# Patient Record
Sex: FEMALE | Race: WHITE | NOT HISPANIC OR LATINO | Employment: STUDENT | URBAN - METROPOLITAN AREA
[De-identification: names, ages, dates, MRNs, and addresses within clinical notes are randomized per-mention and may not be internally consistent; named-entity substitution may affect disease eponyms.]

---
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
## 2018-01-11 NOTE — RESULT NOTES
Verified Results  [facility]) CBC+Platelet+Hem Review [id number] 12:00AM Daphine Nelson     Test Name Result Flag Reference   WBC 7 7 x10E3/uL  3 4-10 8   RBC 4 61 x10E6/uL  3 77-5 28   Hemoglobin 10 1 g/dL L 11 1-15 9   Hematocrit 32 7 % L 34 0-46  6   MCV 71 fL L 79-97   MCH 21 9 pg L 26 6-33 0   MCHC 30 9 g/dL L 31 5-35 7   RDW 16 6 % H 12 3-15 4   Platelets 478 C06T2/DK  150-379   Neutrophils 47 %     Lymphs 47 %     Monocytes 4 %     Eos 0 %     Basos 2 %     Neutrophils Absolute 3 6 X10E3/uL  1 4-7 0   Lymphs (Absolute) 3 6 X10E3/uL H 0 7-3 1   Monocytes(Absolute) 0 3 X10E3/uL  0 1-0 9   Eos (Absolute Value) 0 0 X10E3/uL  0 0-0 4   Baso(Absolute) 0 2 X10E3/uL  0 0-0 2   RBC Comment   Normal   Polychromasia present   Elliptocytes present  Microcytes present     Platelet Comment Adequate  Adequate     (1) IRON [id number] 12:00AM Daphine Nelson     Test Name Result Flag Reference   Iron, Serum 30 ug/dL       (1) FERRITIN [id number] 12:00AM Daphine Nelson     Test Name Result Flag Reference   Ferritin, Serum 3 ng/mL L 15-77     [facility]) CMP14+eGFR [id number] 12:00AM Daphine Nelson     Test Name Result Flag Reference   Glucose, Serum 93 mg/dL  65-99   BUN 10 mg/dL  6-20   Creatinine, Serum 0 60 mg/dL  0 57-1 00   eGFR If NonAfricn Am 133 mL/min/1 73  >59   eGFR If Africn Am 154 mL/min/1 73  >59   BUN/Creatinine Ratio 17  8-20   Sodium, Serum 143 mmol/L  134-144   Potassium, Serum 4 0 mmol/L  3 5-5 2   Chloride, Serum 105 mmol/L     Carbon Dioxide, Total 22 mmol/L  18-29   Calcium, Serum 9 2 mg/dL  8 7-10 2   Protein, Total, Serum 7 0 g/dL  6 0-8 5   Albumin, Serum 4 2 g/dL  3 5-5 5   Globulin, Total 2 8 g/dL  1 5-4 5   A/G Ratio 1 5  1 1-2 5   Bilirubin, Total 0 3 mg/dL  0 0-1 2   Alkaline Phosphatase, S 35 IU/L L    AST (SGOT) 16 IU/L  0-40   ALT (SGPT) 12 IU/L  0-32     (LC) Mono Qual W/Rflx Qn [id number] 12:00AM Daphine Nelson     Test Name Result Flag Reference   Mono Qual W/Rflx Qn Negative Negative   The sensitivity of Heterophile antibody testing is 80-90%  Sydelle Fret IgM testing offers higher sensitivity

## 2018-01-13 VITALS
TEMPERATURE: 97.6 F | WEIGHT: 125.56 LBS | HEART RATE: 79 BPM | DIASTOLIC BLOOD PRESSURE: 70 MMHG | SYSTOLIC BLOOD PRESSURE: 110 MMHG | RESPIRATION RATE: 12 BRPM | HEIGHT: 51 IN | OXYGEN SATURATION: 99 % | BODY MASS INDEX: 33.7 KG/M2

## 2018-01-14 VITALS
RESPIRATION RATE: 16 BRPM | WEIGHT: 115.56 LBS | TEMPERATURE: 99.5 F | HEART RATE: 111 BPM | BODY MASS INDEX: 19.73 KG/M2 | DIASTOLIC BLOOD PRESSURE: 78 MMHG | SYSTOLIC BLOOD PRESSURE: 112 MMHG | OXYGEN SATURATION: 98 % | HEIGHT: 64 IN

## 2018-01-14 NOTE — PROGRESS NOTES
Assessment    1  Encounter for birth control pills maintenance (V25 41) (Z30 41)   2  Iron deficiency anemia (280 9) (D50 9)   3  Irregular menstrual cycle (626 4) (N92 6)    Plan  Iron deficiency anemia, Irregular menstrual cycle    · (1) CBC/PLT/DIFF; Status:Active; Requested for:[id number];    · (1) IRON SATURATION %, TIBC; Status:Active; Requested for:41Mfk6332;    · (1) IRON; Status:Active; Requested for:41Pbw1187;     Discussion/Summary    Patient is a 57-year-old female who presents today at my request for birth control med check, as it has been over 6 months, as well as follow-up for her anemia  Patient was found to have mild iron deficiency anemia earlier this year with a hemoglobin of 10 5  She did admit to heavy periods which seemed to improve slightly since being on birth control  She was initially taking iron supplements 3 times a week, but upon my referral for further evaluation of her symptoms to GYN, day advise that she take her iron supplements daily while she has her period  Patient has not had blood work done since January 2016 and she admits to partial compliance with iron supplement  She is compliant on her daily birth control and is otherwise tolerating the medication well  She gets her menstrual cycle once a month during her placebo pills and states bleeding is very heavy the first 1-2 days but generally tapers off  She does not need a birth control pill refilled today  I will have patient obtain more blood work for follow-up to anemia and make sure the hemoglobin is stable or improved and she was given a prescription for that today  She will get that done at her earliest convenience and we will call her with results and make any adjustments to treatment as necessary  Otherwise, she will continue her birth control daily  Pap recommended age 22  Follow-up in 6 months  Possible side effects of new medications were reviewed with the patient/guardian today   The treatment plan was reviewed with the patient/guardian  The patient/guardian understands and agrees with the treatment plan      Chief Complaint  Pt presents for a birth control med check  History of Present Illness  patient is a 10y/o female here for birth control check and anemia f/u  GYN suggested her taking iron 3 times a week, and daily on her period, as she was still anemic with recheck upon my referral - saw [facility] (jaqueline hand )  She has been taking iron supplement 3 times a week, she tries to take supplement daily when she is on her period  She is not fully compliant with that  SHe is otherwise compliant on her daily birth control, feeling well on medication, denies any intolerance  LMP 9/12/16  Review of Systems    Constitutional: No complaints of fever or chills, feels well, no tiredness, no recent weight gain or loss  Cardiovascular: No complaints of chest pain, no palpitations, normal heart rate, no lower extremity edema  Respiratory: No complaints of cough, no shortness of breath, no wheezing, no leg claudication  Gastrointestinal: No complaints of abdominal pain, no nausea or vomiting, no constipation, no diarrhea or bloody stools  Genitourinary: No complaints of incontinence, no pelvic pain, no dysuria or dysmenorrhea, no abnormal vaginal bleeding or vaginal discharge  Neurological: No complaints of headache, no numbness or tingling, no confusion, no dizziness, no limb weakness, no convulsions or fainting, no difficulty walking  Psychiatric: No complaints of feeling depressed, no suicidal thoughts, no emotional problems, no anxiety, no sleep disturbances, no change in personality  Active Problems    1  Elevated liver enzymes (790 5) (R74 8)   2  Iron deficiency anemia (280 9) (D50 9)   3  Irregular menstrual cycle (626 4) (N92 6)   4  Meningococcal vaccination administered at current visit (V03 89) (Z23)   5   Need for prophylactic vaccination against human papillomavirus (V04 89) (Z23)    Past Medical History    1  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)   2  History of Birth control counseling (V25 09) (Z30 9)   3  History of 's permit physical examination (V70 3) (Z02 4)   4  History of Flu vaccine need (V04 81) (Z23)   5  History of dizziness (V13 89) (Z87 898)   6  History of fatigue (V13 89) (Z87 898)   7  History of headache (V13 89) (Z87 898)   8  History of Shakiness (781 0) (R25 1)   9  History of Urgency of urination (788 63) (R39 15)    Surgical History    1  Denied: History Of Prior Surgery    Family History  Family History    1  No pertinent family history    The family history was reviewed and updated today  Social History    · Never a smoker  The social history was reviewed and updated today  Current Meds   1  Norgestim-Eth Estrad Triphasic 0 18/0 215/0 25 MG-35 MCG Oral Tablet; Take 1 tablet   daily; Therapy: 45LNJ5714 to (Last Rx:99Hti8151)  Requested for: 91TVF6639 Ordered    The medication list was reviewed and updated today  Allergies    1  No Known Drug Allergies    Vitals  Vital Signs    Recorded: 38OZG1636 47:34CL   Systolic 013, LUE, Sitting   Diastolic 76, LUE, Sitting   Heart Rate 84   Pulse Quality Norm   Respiration Quality Norm   Respiration 16   Temperature 98 8 F, Tympanic   LMP 12-Sep-2016   O2 Saturation 99   Height 5 ft 4 in   Weight 121 lb 8 0 oz   BMI Calculated 20 86   BSA Calculated 1 59     Physical Exam    Constitutional - General appearance: No acute distress, well appearing and well nourished  alert, appears healthy, within normal limits of ideal weight, well hydrated and thinner stature  Pulmonary - Respiratory effort: Normal respiratory rate and rhythm, no increased work of breathing  Auscultation of lungs: Clear bilaterally  Cardiovascular - Auscultation of heart: Regular rate and rhythm, normal S1 and S2, no murmur  Pedal pulses: Normal, 2+ bilaterally  Abdomen - Abdomen: Normal bowel sounds, soft, non-tender, no masses   The abdomen was flat  Bowel sounds were normal  The abdomen was soft and nontender  Lymphatic - Palpation of lymph nodes in neck: No anterior or posterior cervical lymphadenopathy  Musculoskeletal - Gait and station: Normal gait  Skin - Skin and subcutaneous tissue: Normal    Psychiatric - Orientation to person, place, and time: Normal  Mood and affect: Normal    Additional Findings - vitals reviewed  Signatures   Electronically signed by : [facility]; Sep 13 2016 12:19PM EST                       (Author)    Electronically signed by : [facility]; Sep 13 2016 12:20PM EST                       (Author)    Electronically signed by :  Kvng Bearden DO; Sep 13 2016 12:23PM EST                       (Author)

## 2018-01-16 NOTE — RESULT NOTES
Verified Results  (1) CBC/PLT/DIFF [id number] 10:02AM Livia Single Order Number: [id number]    TW Order Number: [id number]     Test Name Result Flag Reference   WBC COUNT 5 61 Thousand/uL  4 31-10 16   RBC COUNT 4 50 Million/uL  3 81-5 12   HEMOGLOBIN 10 5 g/dL L 11 5-15 4   HEMATOCRIT 34 1 % L 34 8-46  1   MCV 75 8 fL L 82 0-98 0   MCH 23 3 pg L 26 8-34 3   MCHC 30 8 g/dL L 31 4-37 4   RDW 14 1 %  11 6-15 1   MPV 12 1 fL  8 9-12 7   PLATELET COUNT 264 Thousands/uL  149-390   nRBC AUTOMATED 0 /100 WBCs     NEUTROPHILS RELATIVE PERCENT 48 %  43-75   LYMPHOCYTES RELATIVE PERCENT 39 %  14-44   MONOCYTES RELATIVE PERCENT 11 %  4-12   EOSINOPHILS RELATIVE PERCENT 1 %  0-6   BASOPHILS RELATIVE PERCENT 1 %  0-1   NEUTROPHILS ABSOLUTE COUNT 2 75 Thousands/µL  1 85-7 62   LYMPHOCYTES ABSOLUTE COUNT 2 16 Thousands/µL  0 60-4 47   MONOCYTES ABSOLUTE COUNT 0 63 Thousand/µL  0 17-1 22   EOSINOPHILS ABSOLUTE COUNT 0 04 Thousand/µL  0 00-0 61   BASOPHILS ABSOLUTE COUNT 0 03 Thousands/µL  0 00-0 10     (1) TSH WITH FT4 REFLEX 30Jan2016 10:02AM Mukesh Laser   TW Order Number: [id number]    Patients undergoing fluorescein dye angiography may retain small amounts of fluorescein in the body for 48-72 hours post procedure  Samples containing fluorescein can produce falsely depressed TSH values  If the patient had this procedure,a specimen should be resubmitted post fluorescein clearance  The recommended reference ranges for TSH during pregnancy are as follows:  First trimester 0 1 to 2 5 uIU/mL  Second trimester  0 2 to 3 0 uIU/mL  Third trimester 0 3 to 3 0 uIU/m     Test Name Result Flag Reference   TSH 1 613 uIU/mL  0 463-3 980     (1) LH (LEUTINIZING HORMONE) [id number] 10:02AM Livia Single Order Number: [id number]    Menstruating Females:     Follicular phase 6 8-68 3 mIU/mL    Mid-cycle phase  22 8-76 1 mIU/mL    Luteal phase     0 6-13 5 mIU/mL  Postmemopausal Females     On Menopausal Hormone Therapy (MHT) 1 1-52 4 mIU/mL    Untreated                           8 6-61 8 mIU/mL     Test Name Result Flag Reference   LUTEINIZING HORMONE 0 3 mIU/mL       (1) [facility] [id number] 10:02AM Nuno Evenjanna Order Number: [id number]    Menstruating Females: Follicular Phase   2 0-24 1 mIU/mL     Mid-cycle Phase    5 2-17 5 mIU/mL     Luteal Phase       1 7-9 5  mIU/mL   Postmenopausal Females    On Menopausal Hormone Therapy (HRT) 5 9-72 8 mIU/mL    Untreated                           12 7-132 2 mIU/mL     Test Name Result Flag Reference   FOLLICLE STIMULATING HORMONE 0 3 mIU/mL       (1) TRANSFERRIN 30Jan2016 10:02AM Nuno Evenjanna Order Number: [id number]     Test Name Result Flag Reference   TRANSFERRIN 456 mg/dL H 200-400     (1) FERRITIN [id number] 10:02AM Nuno Evenjanna Order Number: [id number]     Test Name Result Flag Reference   FERRITIN 2 ng/mL L 8-388     (1) PROGESTERONE [id number] 10:02AM Nuno Evenjanna Order Number: [id number]    Serum progesterone 5-25 ng/mL should not be the sole determinant in excluding pregnancy  Menstruating Females:     Follicular phase 1 811-8 44 ng/mL      Luteal phase     2 25-24 2 ng/mL    Mid-luteal phase 8 76-21 6 ng/mL  Postmenopausal Females  <0 200-0 901 ng/mL    Pregnant Females:    First trimester of pregnancy  11 4-41 0 ng/mL    Second trimester of pregnancy 13 8-156 ng/mL    Third trimester of pregnancy  51 4->200 ng/mL     Test Name Result Flag Reference   PROGESTERONE 0 60 ng/mL       (1) IRON SATURATION %, TIBC 30Jan2016 10:02AM Nuno Evenjanna Order Number: [id number]     Test Name Result Flag Reference   IRON SATURATION 6 %     TOTAL IRON BINDING CAPACITY 663 ug/dL H 250-450   IRON 43 ug/dL L      (1) TESTOSTERONE [id number] 10:02AM Nuno Evenjanna Order Number: [id number]     Order Number: [id number]     Test Name Result Flag Reference   TESTOSTERONE 10 33 ng/dL L 14-76     * US PELVIS COMPLETE (TRANSABDOMINAL AND TRANSVAGINAL) [id number] 09:26AM Sonia Reardon     Test Name Result Flag Reference   US PELVIS COMPLETE (TRANSABDOMINAL AND TRANSVAGINAL) (Report)     PELVIC ULTRASOUND, COMPLETE     INDICATION: 14year-old with irregular menses  LMP was 1/1/2016      COMPARISON: None  TECHNIQUE:  Transabdominal pelvic ultrasound was performed in sagittal and transverse planes with a curvilinear transducer  Additional transvaginal imaging was performed to better evaluate the endometrium and ovaries  Imaging included volumetric    sweeps as well as traditional still imaging technique  FINDINGS:     UTERUS:   The uterus is anteverted in position, measuring 7 1 x 3 7 x 3 8 cm  Contour and echotexture appear normal      The cervix shows no suspicious abnormality  ENDOMETRIUM:    Normal caliber of 5 mm  Homogenous and normal in appearance  OVARIES/ADNEXA:   Right ovary: 3 3 x 1 7 x 1 9 cm, total volume 5 5 mL  No suspicious right ovarian abnormality  Doppler flow within normal limits  Left ovary: 2 5 x 1 3 x 1 5 cm, total volume 2 6 mL  No suspicious left ovarian abnormality  Doppler flow within normal limits  No suspicious adnexal mass or loculated collections  There is small amount of simple free fluid in the pelvis, likely physiologic in this premenopausal female  IMPRESSION:      Normal pelvic ultrasound          Signed by:   Lana Cockayne, MD   2/1/16

## 2018-04-19 DIAGNOSIS — Z30.9 ENCOUNTER FOR CONTRACEPTIVE MANAGEMENT, UNSPECIFIED TYPE: Primary | ICD-10-CM

## 2018-04-19 RX ORDER — NORGESTIMATE AND ETHINYL ESTRADIOL 7DAYSX3 28
1 KIT ORAL DAILY
COMMUNITY
Start: 2012-10-23 | End: 2018-04-19 | Stop reason: SDUPTHER

## 2018-04-20 DIAGNOSIS — Z30.9 ENCOUNTER FOR CONTRACEPTIVE MANAGEMENT, UNSPECIFIED TYPE: ICD-10-CM

## 2018-04-20 RX ORDER — MELOXICAM 7.5 MG/1
1-2 TABLET ORAL DAILY
COMMUNITY
Start: 2017-08-22 | End: 2018-10-08

## 2018-04-20 RX ORDER — NORGESTIMATE AND ETHINYL ESTRADIOL 7DAYSX3 28
1 KIT ORAL DAILY
Qty: 28 TABLET | Refills: 0 | Status: SHIPPED | OUTPATIENT
Start: 2018-04-20 | End: 2018-04-20 | Stop reason: SDUPTHER

## 2018-04-20 NOTE — TELEPHONE ENCOUNTER
Pt called prescription line  Stated she changed her insurance and they are starting a mail order and she will run out of her birthcontrol before she gets mail order   Pt wondering  if we can send 30 days refill to [facility]

## 2018-04-20 NOTE — TELEPHONE ENCOUNTER
Pt left message on rx line, her BC is on back order through her mail order, she'd like a one month supply sent to [facility]

## 2018-04-21 RX ORDER — NORGESTIMATE AND ETHINYL ESTRADIOL 7DAYSX3 28
1 KIT ORAL DAILY
Qty: 28 TABLET | Refills: 0 | Status: SHIPPED | OUTPATIENT
Start: 2018-04-21 | End: 2018-09-21 | Stop reason: SDUPTHER

## 2018-09-19 DIAGNOSIS — Z30.9 ENCOUNTER FOR CONTRACEPTIVE MANAGEMENT, UNSPECIFIED TYPE: ICD-10-CM

## 2018-09-21 RX ORDER — NORGESTIMATE AND ETHINYL ESTRADIOL 7DAYSX3 28
1 KIT ORAL DAILY
Qty: 28 TABLET | Refills: 0 | Status: SHIPPED | OUTPATIENT
Start: 2018-09-21 | End: 2018-09-24

## 2018-09-21 NOTE — TELEPHONE ENCOUNTER
Please octavia up for me accordingly but pt will also need f/u appt for birth control  PLEASE CALL PT TO SET UP AN APPT FOR BC MED CHECK   HASNT BEEN SEEN IN OVER A YEAR

## 2018-09-24 ENCOUNTER — TELEPHONE (OUTPATIENT)
Dept: FAMILY MEDICINE CLINIC | Facility: CLINIC | Age: 18
End: 2018-09-24

## 2018-09-24 DIAGNOSIS — Z30.9 ENCOUNTER FOR CONTRACEPTIVE MANAGEMENT, UNSPECIFIED TYPE: Primary | ICD-10-CM

## 2018-09-24 RX ORDER — NORGESTIMATE AND ETHINYL ESTRADIOL 7DAYSX3 28
1 KIT ORAL DAILY
Qty: 28 TABLET | Refills: 2 | Status: SHIPPED | OUTPATIENT
Start: 2018-09-24 | End: 2018-10-08 | Stop reason: SDUPTHER

## 2018-09-24 NOTE — TELEPHONE ENCOUNTER
Please tell pt we just got notification from her pharmacy 9/20 (over weekend)  I had sent her birth control refill prior to that (9/19)   I sent the tri sprintec to her local pharm ([facility]) for her have enough until she can get in for med check

## 2018-09-24 NOTE — TELEPHONE ENCOUNTER
Pt stated she uses [facility] mail [facility]  I advised pt she can  the one month of her oral birth control see wilfredo and get a new script [facility] to [facility]

## 2018-09-24 NOTE — TELEPHONE ENCOUNTER
I called Kim Wright and left a detailed message on her cell phone consent ok  I gave her all the details of Latonya's  message and requested she please  call to schedule a med check since Vesna Ek sent enough until she can get into the office

## 2018-09-24 NOTE — TELEPHONE ENCOUNTER
Pt called this morning stating that she has not received her BC so she called her mail order and they stated that they have sent us several of requests to have her [facility] brand name changed due to insurance purposes  Pt also asked if there is any way that we can send something to her local pharmacy as well due to the fact that she took her last BC pill on Saturday and does not want to be without her pills for too long  Please call Pt and see what Pharmacy  When I spoke to her this morning she wanted it sent to a pharmacy next to her school  But I assume she is out of school now so please check with Pt what local pharmacy  Pt's Mail order is [facility] and I will hand you the form that was faxed to us for these changes

## 2018-10-08 ENCOUNTER — OFFICE VISIT (OUTPATIENT)
Dept: FAMILY MEDICINE CLINIC | Facility: CLINIC | Age: 18
End: 2018-10-08
Payer: COMMERCIAL

## 2018-10-08 VITALS
HEART RATE: 76 BPM | TEMPERATURE: 99 F | OXYGEN SATURATION: 99 % | BODY MASS INDEX: 22.45 KG/M2 | HEIGHT: 63 IN | RESPIRATION RATE: 18 BRPM | DIASTOLIC BLOOD PRESSURE: 64 MMHG | SYSTOLIC BLOOD PRESSURE: 108 MMHG | WEIGHT: 126.7 LBS

## 2018-10-08 DIAGNOSIS — Z30.9 ENCOUNTER FOR CONTRACEPTIVE MANAGEMENT, UNSPECIFIED TYPE: Primary | ICD-10-CM

## 2018-10-08 DIAGNOSIS — Z23 NEEDS FLU SHOT: ICD-10-CM

## 2018-10-08 PROCEDURE — 90682 RIV4 VACC RECOMBINANT DNA IM: CPT | Performed by: PHYSICIAN ASSISTANT

## 2018-10-08 PROCEDURE — 99213 OFFICE O/P EST LOW 20 MIN: CPT | Performed by: PHYSICIAN ASSISTANT

## 2018-10-08 PROCEDURE — 90471 IMMUNIZATION ADMIN: CPT | Performed by: PHYSICIAN ASSISTANT

## 2018-10-08 RX ORDER — NORGESTIMATE AND ETHINYL ESTRADIOL 7DAYSX3 28
1 KIT ORAL DAILY
Qty: 84 TABLET | Refills: 3 | Status: SHIPPED | OUTPATIENT
Start: 2018-10-08 | End: 2019-08-01 | Stop reason: SDUPTHER

## 2018-10-08 NOTE — PROGRESS NOTES
Assessment/Plan:    Irregular menstrual cycle  Periods are stable on oral BC - tri sprintec  Continue at current regimen  Periods are regular  Diagnoses and all orders for this visit:    Encounter for contraceptive management, unspecified type  -     norgestimate-ethinyl estradiol (TRI-SPRINTEC) 0 18/0 215/0 25 MG-35 MCG per tablet; Take 1 tablet by mouth daily    Needs flu shot  -     influenza vaccine, [id number], quadrivalent, recombinant, PF, 0 5 mL, for patients 18-49 yr with comorbidities (FLUBLOK)       Patient is a 68-year-old female presenting today for birth control med check  She is compliant on birth control and doing well  Her menstrual cycles are cycling with the birth control pills and her periods are with placebo  She has not been sexually active in over a year and she is asymptomatic  Educated patient on STD prevention as well as recommendations for cervical cancer screening which I would advise starting at 24  Flu vaccine administered today  Recheck in 1 year  Chief Complaint   Patient presents with    Med check       Subjective:      Patient ID: Ray Mancia is a 21 y o  female  24y/o female here today for birth control med check  She overall feels well, no complaints today  Compliant on medications, denies side effects  She has not been sexually active in 1 year  Did use condoms consistently in past  No urinary issues or vaginal sxs  LMP 10/4/18  Periods are regular, cycling with birth control placebo  The following portions of the patient's history were reviewed and updated as appropriate: allergies, current medications, past family history, past medical history, past social history, past surgical history and problem list     Review of Systems   Constitutional: Negative  Respiratory: Negative  Cardiovascular: Negative  Gastrointestinal: Negative  Genitourinary: Negative  Neurological: Negative  Psychiatric/Behavioral: Negative  Objective:      /64   Pulse 76   Temp 99 °F (37 2 °C) (Tympanic)   Resp 18   Ht 5' 2 99" (1 6 m)   Wt 57 5 kg (126 lb 11 2 oz)   LMP 10/04/2018   SpO2 99%   BMI 22 45 kg/m²          Physical Exam   Constitutional: She is oriented to person, place, and time  She appears well-developed and well-nourished  No distress  Neck: Neck supple  No thyromegaly present  Cardiovascular: Normal rate, regular rhythm, normal heart sounds and intact distal pulses  Pulmonary/Chest: Effort normal and breath sounds normal    Abdominal: Soft  Bowel sounds are normal  There is no tenderness  Lymphadenopathy:     She has no cervical adenopathy  Neurological: She is alert and oriented to person, place, and time  Psychiatric: She has a normal mood and affect  Vitals reviewed

## 2018-11-20 ENCOUNTER — OFFICE VISIT (OUTPATIENT)
Dept: FAMILY MEDICINE CLINIC | Facility: CLINIC | Age: 18
End: 2018-11-20
Payer: COMMERCIAL

## 2018-11-20 VITALS
BODY MASS INDEX: 22.95 KG/M2 | OXYGEN SATURATION: 98 % | TEMPERATURE: 100 F | WEIGHT: 129.5 LBS | HEIGHT: 63 IN | RESPIRATION RATE: 14 BRPM | DIASTOLIC BLOOD PRESSURE: 72 MMHG | SYSTOLIC BLOOD PRESSURE: 124 MMHG | HEART RATE: 92 BPM

## 2018-11-20 DIAGNOSIS — J30.2 SEASONAL ALLERGIC RHINITIS, UNSPECIFIED TRIGGER: Primary | ICD-10-CM

## 2018-11-20 PROCEDURE — 99213 OFFICE O/P EST LOW 20 MIN: CPT | Performed by: PHYSICIAN ASSISTANT

## 2018-11-20 PROCEDURE — 3008F BODY MASS INDEX DOCD: CPT | Performed by: PHYSICIAN ASSISTANT

## 2018-11-20 PROCEDURE — 1036F TOBACCO NON-USER: CPT | Performed by: PHYSICIAN ASSISTANT

## 2018-11-20 RX ORDER — BENZONATATE 200 MG/1
200 CAPSULE ORAL 3 TIMES DAILY PRN
Qty: 30 CAPSULE | Refills: 0 | Status: SHIPPED | OUTPATIENT
Start: 2018-11-20 | End: 2019-07-02 | Stop reason: ALTCHOICE

## 2018-11-20 RX ORDER — FLUTICASONE PROPIONATE 50 MCG
2 SPRAY, SUSPENSION (ML) NASAL DAILY
Qty: 16 G | Refills: 2 | Status: SHIPPED | OUTPATIENT
Start: 2018-11-20 | End: 2019-07-02 | Stop reason: ALTCHOICE

## 2018-11-20 NOTE — PATIENT INSTRUCTIONS
start multivitamin and vitamin C  Continue allergy medication DAILY PLUS start fluticasone nasal spray

## 2018-11-20 NOTE — PROGRESS NOTES
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 and states that she was getting better but after completing course the symptoms came back  She has no infectious type symptoms today and her exam is relatively unremarkable  I do question more of an allergic component  I have advised that we avoid antibiotics at this point and treat symptomatically  She is not taking her allergy pill on a regular basis so I will have her start taking a a 24 hr antihistamine daily  I will have her start a benzonatate cough medicine as needed for cough control  I will also have her start using a steroid nasal spray, fluticasone 2 sprays each nostril daily  I advised that she start a daily multivitamin  As well as a vitamin C  She was advised to take care of her body drinking plenty of water and resting  We will give treatment about a week or 2 and she was encouraged to call with worsening symptoms  At that point I may consider antibiotic use  Depending on outcome I may consider further workup with allergy testing or referral, as she   States that she gets similar symptoms every year around this time  Chief Complaint   Patient presents with    Cold Like Symptoms     for the past 2 months intermittently  Pt states she had abx prescribed through school clinic but didnt help       Subjective:     Patient ID: Refugio Wright is a 21 y o  female  22y/o female here today for persistent URI sxs   States about 2 weeks ago was given amoxil from her college and helped but day after finished sxs came back  She states she has persistent mild sore throat, PND, mucousy cough, nasal congestion  No chest sxs  No SOB  No fevers  Uses humidifier  Ear pain has resolved  She has taken the antibiotic, sudafed, mucinex, allergy medications  Review of Systems   Constitutional: Negative  HENT:        As in HPI   Respiratory:        As in HPI   Cardiovascular: Negative  Gastrointestinal: Negative  Neurological: Negative  Psychiatric/Behavioral: Negative  The following portions of the patient's history were reviewed and updated as appropriate: allergies, current medications, past family history, past medical history, past social history, past surgical history and problem list       Objective:     Physical Exam   Constitutional: She is oriented to person, place, and time  She appears well-developed and well-nourished  No distress  HENT:   Head: Normocephalic  Right Ear: Tympanic membrane and ear canal normal    Left Ear: Tympanic membrane and ear canal normal    Nose: Nose normal    Mouth/Throat: Oropharynx is clear and moist    Neck: Neck supple  Cardiovascular: Normal rate, regular rhythm and normal heart sounds  Pulmonary/Chest: Effort normal and breath sounds normal    Neurological: She is alert and oriented to person, place, and time  Psychiatric: She has a normal mood and affect  Vitals reviewed        Vitals:    11/20/18 1408   BP: 124/72   BP Location: Left arm   Patient Position: Sitting   Cuff Size: Adult   Pulse: 92   Resp: 14   Temp: 100 °F (37 8 °C)   TempSrc: Tympanic   SpO2: 98%   Weight: 58 7 kg (129 lb 8 oz)   Height: 5' 3" (1 6 m)

## 2019-07-01 RX ORDER — METHYLPREDNISOLONE 4 MG/1
TABLET ORAL
Refills: 0 | COMMUNITY
Start: 2019-06-18 | End: 2019-07-02 | Stop reason: ALTCHOICE

## 2019-07-01 RX ORDER — MONTELUKAST SODIUM 10 MG/1
10 TABLET ORAL DAILY PRN
Refills: 0 | COMMUNITY
Start: 2019-06-18 | End: 2019-08-22 | Stop reason: SDUPTHER

## 2019-07-02 ENCOUNTER — OFFICE VISIT (OUTPATIENT)
Dept: FAMILY MEDICINE CLINIC | Facility: CLINIC | Age: 19
End: 2019-07-02
Payer: COMMERCIAL

## 2019-07-02 VITALS
BODY MASS INDEX: 23.92 KG/M2 | RESPIRATION RATE: 16 BRPM | DIASTOLIC BLOOD PRESSURE: 70 MMHG | SYSTOLIC BLOOD PRESSURE: 110 MMHG | TEMPERATURE: 100.8 F | HEART RATE: 99 BPM | OXYGEN SATURATION: 98 % | WEIGHT: 135 LBS | HEIGHT: 63 IN

## 2019-07-02 DIAGNOSIS — J30.2 SEASONAL ALLERGIC RHINITIS, UNSPECIFIED TRIGGER: ICD-10-CM

## 2019-07-02 DIAGNOSIS — J02.0 STREP PHARYNGITIS: Primary | ICD-10-CM

## 2019-07-02 LAB — S PYO AG THROAT QL: POSITIVE

## 2019-07-02 PROCEDURE — 87880 STREP A ASSAY W/OPTIC: CPT | Performed by: NURSE PRACTITIONER

## 2019-07-02 PROCEDURE — 99213 OFFICE O/P EST LOW 20 MIN: CPT | Performed by: NURSE PRACTITIONER

## 2019-07-02 RX ORDER — AMOXICILLIN 875 MG/1
875 TABLET, COATED ORAL 2 TIMES DAILY
Qty: 20 TABLET | Refills: 0 | Status: SHIPPED | OUTPATIENT
Start: 2019-07-02 | End: 2019-07-12

## 2019-07-02 RX ORDER — FLUTICASONE PROPIONATE 50 MCG
1 SPRAY, SUSPENSION (ML) NASAL DAILY
Qty: 1 BOTTLE | Refills: 2 | Status: SHIPPED | OUTPATIENT
Start: 2019-07-02 | End: 2021-07-02 | Stop reason: ALTCHOICE

## 2019-07-06 NOTE — PROGRESS NOTES
[facility]    ASSESSMENT AND PLAN     1  Strep pharyngitis  Presents today with S/S consistent of strep throat  In office rapid strep:  Positive  Rx today for amoxicillin times 10 days  Dose, use, symptom management reviewed  Advised re-evaluation should symptoms change, persist, worsen  - amoxicillin (AMOXIL) 875 mg tablet; Take 1 tablet (875 mg total) by mouth 2 (two) times a day for 10 days  Dispense: 20 tablet; Refill: 0    2  Seasonal allergic rhinitis, unspecified trigger  Patient with ongoing seasonal allergies  Recommend Flonase nasal spray for symptom management  Rx given  - fluticasone (FLONASE) 50 mcg/act nasal spray; 1 spray into each nostril daily  Dispense: 1 Bottle; Refill: 2  - POCT rapid strepA            SUBJECTIVE       Patient ID: Janelle Venegas is a 19 y o  female  Chief Complaint   Patient presents with    URI     congested,cough x 1 month       HISTORY OF PRESENT ILLNESS    Presents today with complaint of sinus pain/pressure, sore throat and a cough for 2-3 weeks  Was recently in [place] and noted symptoms upon her return  She was seen at an urgent care and given a steroid taper and singular  Her symptoms have not really improved  She has been taking Claritin, Mucinex and dull some with no relief  The following portions of the patient's history were reviewed and updated as appropriate: allergies, current medications, past family history, past medical history, past social history, past surgical history and problem list     REVIEW OF SYSTEMS  Review of Systems   Constitutional: Negative  HENT: Positive for congestion, postnasal drip, sinus pain and sore throat  Negative for ear pain  Respiratory: Positive for cough  Negative for chest tightness, shortness of breath and wheezing  Neurological: Negative  Psychiatric/Behavioral: Negative          OBJECTIVE      VITAL SIGNS  /70 (BP Location: Left arm, Patient Position: Sitting, Cuff Size: Adult)   Pulse 99   Temp (!) 100 8 °F (38 2 °C) (Tympanic)   Resp 16   Ht 5' 2 99" (1 6 m)   Wt 61 2 kg (135 lb)   LMP 06/19/2019   SpO2 98%   BMI 23 92 kg/m²     CURRENT MEDICATIONS    Current Outpatient Medications:     montelukast (SINGULAIR) 10 mg tablet, Take 10 mg by mouth daily as needed, Disp: , Rfl: 0    norgestimate-ethinyl estradiol (TRI-SPRINTEC) 0 18/0 215/0 25 MG-35 MCG per tablet, Take 1 tablet by mouth daily, Disp: 84 tablet, Rfl: 3    amoxicillin (AMOXIL) 875 mg tablet, Take 1 tablet (875 mg total) by mouth 2 (two) times a day for 10 days, Disp: 20 tablet, Rfl: 0    fluticasone (FLONASE) 50 mcg/act nasal spray, 1 spray into each nostril daily, Disp: 1 Bottle, Rfl: 2      PHYSICAL EXAMINATION   Physical Exam   Constitutional: She is oriented to person, place, and time  Vital signs are normal  She appears well-developed and well-nourished  HENT:   Head: Normocephalic  Right Ear: Hearing, tympanic membrane, external ear and ear canal normal    Left Ear: Hearing, tympanic membrane, external ear and ear canal normal    Nose: Mucosal edema present  Right sinus exhibits frontal sinus tenderness  Left sinus exhibits frontal sinus tenderness  Mouth/Throat: Mucous membranes are dry  Oropharyngeal exudate and posterior oropharyngeal erythema present  Eyes: Conjunctivae are normal  Right eye exhibits no discharge  Left eye exhibits no discharge  Cardiovascular: Normal rate and regular rhythm  Pulmonary/Chest: Effort normal and breath sounds normal  No respiratory distress  Musculoskeletal: Normal range of motion  Lymphadenopathy:        Head (right side): Submandibular adenopathy present  No submental, no tonsillar, no preauricular, no posterior auricular and no occipital adenopathy present  Head (left side): No submental, no submandibular, no tonsillar, no preauricular, no posterior auricular and no occipital adenopathy present  She has no cervical adenopathy  Neurological: She is alert and oriented to person, place, and time  Skin: Skin is warm, dry and intact  Psychiatric: She has a normal mood and affect  Nursing note and vitals reviewed

## 2019-07-12 DIAGNOSIS — J02.0 STREP PHARYNGITIS: ICD-10-CM

## 2019-07-15 RX ORDER — AMOXICILLIN 875 MG/1
TABLET, COATED ORAL
Qty: 20 TABLET | Refills: 0 | OUTPATIENT
Start: 2019-07-15

## 2019-07-15 NOTE — TELEPHONE ENCOUNTER
Pt has a recheck appointment with you on 7/17/19, states Amoxicillin is working but Sx's have not subsided

## 2019-07-15 NOTE — TELEPHONE ENCOUNTER
Please call patient and schedule a nurse visit  I would like her to have an in office throat swab to evaluate if a longer antibiotic course is needed

## 2019-07-18 ENCOUNTER — OFFICE VISIT (OUTPATIENT)
Dept: FAMILY MEDICINE CLINIC | Facility: CLINIC | Age: 19
End: 2019-07-18
Payer: COMMERCIAL

## 2019-07-18 VITALS
HEIGHT: 64 IN | DIASTOLIC BLOOD PRESSURE: 64 MMHG | SYSTOLIC BLOOD PRESSURE: 100 MMHG | TEMPERATURE: 98.9 F | OXYGEN SATURATION: 99 % | BODY MASS INDEX: 22.09 KG/M2 | HEART RATE: 82 BPM | WEIGHT: 129.4 LBS

## 2019-07-18 DIAGNOSIS — J06.9 UPPER RESPIRATORY TRACT INFECTION, UNSPECIFIED TYPE: Primary | ICD-10-CM

## 2019-07-18 PROCEDURE — 99213 OFFICE O/P EST LOW 20 MIN: CPT | Performed by: NURSE PRACTITIONER

## 2019-07-18 RX ORDER — AZITHROMYCIN 250 MG/1
TABLET, FILM COATED ORAL
Qty: 6 TABLET | Refills: 0 | Status: SHIPPED | OUTPATIENT
Start: 2019-07-18 | End: 2019-07-22

## 2019-07-18 NOTE — PROGRESS NOTES
[facility]    ASSESSMENT AND PLAN     1  Upper respiratory tract infection, unspecified type  Presents today with S/S consistent of URI  Rx today for azithromycin x5 days  Dose, use, symptom management reviewed  Recommend re-evaluate symptoms change, persist, worsen  Consider chest x-ray at that time  - azithromycin (ZITHROMAX) 250 mg tablet; Take 2 tablets today then 1 tablet daily x 4 days  Dispense: 6 tablet; Refill: 0            SUBJECTIVE       Patient ID: Russell Hernandez is a 19 y o  female  Chief Complaint   Patient presents with    Follow-up     Pt is here for a follow up to pharyngitis  Pt states she is still feeling the same  [facility] HISTORY OF PRESENT ILLNESS    Presents today with sinus pressure and productive cough  Symptoms have been present for the last 2 weeks but have been worsening  Mucus is thick yellow  Recently evaluated 7/2 and treated for strep  Patient states did not take the antibiotic as prescribed, only taking 1 a day for 10 days  Denies fever  Denies any GI distress        The following portions of the patient's history were reviewed and updated as appropriate: allergies, current medications, past family history, past medical history, past social history, past surgical history and problem list     REVIEW OF SYSTEMS  Review of Systems   Constitutional: Negative  HENT: Positive for congestion, postnasal drip, sinus pressure and sore throat  Negative for trouble swallowing  Ear pain: Fullness  Respiratory: Positive for cough and chest tightness  Negative for shortness of breath and wheezing  Cardiovascular: Negative  Gastrointestinal: Negative  Neurological: Negative  Psychiatric/Behavioral: Negative          OBJECTIVE      VITAL SIGNS  /64 (BP Location: Left arm, Patient Position: Sitting, Cuff Size: Adult)   Pulse 82   Temp 98 9 °F (37 2 °C) (Tympanic)   Ht 5' 4 37" (1 635 m)   Wt 58 7 kg (129 lb 6 4 oz)   LMP 07/18/2019   SpO2 99% BMI 21 96 kg/m²     CURRENT MEDICATIONS    Current Outpatient Medications:     fluticasone (FLONASE) 50 mcg/act nasal spray, 1 spray into each nostril daily, Disp: 1 Bottle, Rfl: 2    norgestimate-ethinyl estradiol (TRI-SPRINTEC) 0 18/0 215/0 25 MG-35 MCG per tablet, Take 1 tablet by mouth daily, Disp: 84 tablet, Rfl: 3    azithromycin (ZITHROMAX) 250 mg tablet, Take 2 tablets today then 1 tablet daily x 4 days, Disp: 6 tablet, Rfl: 0    montelukast (SINGULAIR) 10 mg tablet, Take 10 mg by mouth daily as needed, Disp: , Rfl: 0      PHYSICAL EXAMINATION   Physical Exam   Constitutional: She is oriented to person, place, and time  Vital signs are normal  She appears well-developed and well-nourished  HENT:   Head: Normocephalic  Right Ear: Hearing, tympanic membrane, external ear and ear canal normal    Left Ear: Hearing, tympanic membrane, external ear and ear canal normal    Nose: Mucosal edema present  Mouth/Throat: Posterior oropharyngeal erythema present  No tonsillar exudate  Eyes: Conjunctivae are normal  Right eye exhibits no discharge  Left eye exhibits no discharge  Cardiovascular: Normal rate and regular rhythm  Pulmonary/Chest: Effort normal  No respiratory distress  She has no decreased breath sounds  She has wheezes in the right lower field and the left lower field  She has no rhonchi  She has no rales  Musculoskeletal: Normal range of motion  Lymphadenopathy:        Head (right side): No submental, no submandibular (Tender), no tonsillar, no preauricular, no posterior auricular and no occipital adenopathy present  Head (left side): No submental, no submandibular ( tender), no tonsillar, no preauricular, no posterior auricular and no occipital adenopathy present  She has no cervical adenopathy  Neurological: She is alert and oriented to person, place, and time  Skin: Skin is warm, dry and intact  Psychiatric: She has a normal mood and affect     Nursing note and vitals reviewed

## 2019-08-01 DIAGNOSIS — Z30.9 ENCOUNTER FOR CONTRACEPTIVE MANAGEMENT, UNSPECIFIED TYPE: ICD-10-CM

## 2019-08-01 RX ORDER — NORGESTIMATE AND ETHINYL ESTRADIOL 7DAYSX3 28
1 KIT ORAL DAILY
Qty: 28 TABLET | Refills: 0 | Status: SHIPPED | OUTPATIENT
Start: 2019-08-01 | End: 2019-08-09 | Stop reason: SDUPTHER
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
## 2019-08-09 DIAGNOSIS — Z30.9 ENCOUNTER FOR CONTRACEPTIVE MANAGEMENT, UNSPECIFIED TYPE: ICD-10-CM

## 2019-08-09 RX ORDER — NORGESTIMATE AND ETHINYL ESTRADIOL 7DAYSX3 28
KIT ORAL
Qty: 84 TABLET | Refills: 1 | Status: SHIPPED | OUTPATIENT
Start: 2019-08-09 | End: 2020-01-11

## 2019-08-22 ENCOUNTER — OFFICE VISIT (OUTPATIENT)
Dept: FAMILY MEDICINE CLINIC | Facility: CLINIC | Age: 19
End: 2019-08-22
Payer: COMMERCIAL

## 2019-08-22 VITALS
TEMPERATURE: 97.9 F | OXYGEN SATURATION: 99 % | BODY MASS INDEX: 21.69 KG/M2 | HEART RATE: 80 BPM | RESPIRATION RATE: 15 BRPM | WEIGHT: 130.2 LBS | HEIGHT: 65 IN | SYSTOLIC BLOOD PRESSURE: 98 MMHG | DIASTOLIC BLOOD PRESSURE: 48 MMHG

## 2019-08-22 DIAGNOSIS — J30.2 SEASONAL ALLERGIC RHINITIS, UNSPECIFIED TRIGGER: Primary | ICD-10-CM

## 2019-08-22 DIAGNOSIS — J02.9 SORE THROAT: ICD-10-CM

## 2019-08-22 LAB — S PYO AG THROAT QL: NEGATIVE

## 2019-08-22 PROCEDURE — 99213 OFFICE O/P EST LOW 20 MIN: CPT | Performed by: NURSE PRACTITIONER

## 2019-08-22 PROCEDURE — 1036F TOBACCO NON-USER: CPT | Performed by: NURSE PRACTITIONER

## 2019-08-22 PROCEDURE — 87880 STREP A ASSAY W/OPTIC: CPT | Performed by: NURSE PRACTITIONER

## 2019-08-22 PROCEDURE — 3008F BODY MASS INDEX DOCD: CPT | Performed by: NURSE PRACTITIONER

## 2019-08-22 RX ORDER — MONTELUKAST SODIUM 10 MG/1
10 TABLET ORAL DAILY
Qty: 90 TABLET | Refills: 0 | Status: SHIPPED | OUTPATIENT
Start: 2019-08-22 | End: 2019-11-13 | Stop reason: SDUPTHER

## 2019-08-22 NOTE — PROGRESS NOTES
[facility]    ASSESSMENT AND PLAN     1  Seasonal allergic rhinitis, unspecified trigger  S/S consistent today with allergic rhinitis  No signs of infection noted  Assessment as below  Recommend supportive care  Hydrate, warm salt water gargles  Continue using Flonase  Rx renewed for singular  Patient has not been taking it routinely for several weeks  Return for re-evaluate if symptoms persist or worsen  - montelukast (SINGULAIR) 10 mg tablet; Take 1 tablet (10 mg total) by mouth daily  Dispense: 90 tablet; Refill: 0    2  Sore throat    - POCT rapid strepA- Negative            SUBJECTIVE       Patient ID: Nick Salas is a 19 y o  female  Chief Complaint   Patient presents with    Sore Throat     Pt c/o trouble swallowing x3 days  Pt does have some abd discomfort but states she is on her period  Pt also has a cough with headaches  HISTORY OF PRESENT ILLNESS    Presents today with complaint of a sore throat for the last 3 days  States she has pain and burning  Occasional sinus pressure and headache  No respiratory  Denies fever  Denies GI distress  She states that her sister said that she had strep throat a few days ago and then drink from her class  She states that her symptoms started shortly after that  The following portions of the patient's history were reviewed and updated as appropriate: allergies, current medications, past family history, past medical history, past social history, past surgical history and problem list     REVIEW OF SYSTEMS  Review of Systems   Constitutional: Negative  HENT: Positive for postnasal drip, sinus pressure and sore throat  Negative for trouble swallowing  Respiratory: Negative  Cardiovascular: Negative  Gastrointestinal: Negative  Neurological: Positive for headaches  Psychiatric/Behavioral: Negative          OBJECTIVE      VITAL SIGNS  BP (!) 98/48 (BP Location: Left arm, Patient Position: Sitting, Cuff Size: Adult)   Pulse 80   Temp 97 9 °F (36 6 °C) (Tympanic)   Resp 15   Ht 5' 4 65" (1 642 m)   Wt 59 1 kg (130 lb 3 2 oz)   SpO2 99%   BMI 21 90 kg/m²     CURRENT MEDICATIONS    Current Outpatient Medications:     fluticasone (FLONASE) 50 mcg/act nasal spray, 1 spray into each nostril daily, Disp: 1 Bottle, Rfl: 2    TRI-SPRINTEC 0 18/0 215/0 25 MG-35 MCG per tablet, TAKE 1 TABLET BY MOUTH  DAILY, Disp: 84 tablet, Rfl: 1    montelukast (SINGULAIR) 10 mg tablet, Take 1 tablet (10 mg total) by mouth daily, Disp: 90 tablet, Rfl: 0      PHYSICAL EXAMINATION   Physical Exam   Constitutional: She is oriented to person, place, and time  Vital signs are normal  She appears well-developed and well-nourished  HENT:   Head: Normocephalic  Right Ear: Hearing, tympanic membrane, external ear and ear canal normal    Left Ear: Hearing, tympanic membrane, external ear and ear canal normal    Nose: Mucosal edema present  Mouth/Throat: Oropharynx is clear and moist and mucous membranes are normal    Turbinates boggy   Cardiovascular: Normal rate and regular rhythm  Pulmonary/Chest: Effort normal and breath sounds normal  No respiratory distress  Musculoskeletal: Normal range of motion  Lymphadenopathy:        Head (right side): No submental, no submandibular, no tonsillar, no preauricular, no posterior auricular and no occipital adenopathy present  Head (left side): No submental, no submandibular, no tonsillar, no preauricular, no posterior auricular and no occipital adenopathy present  She has no cervical adenopathy  Neurological: She is alert and oriented to person, place, and time  Skin: Skin is warm, dry and intact  Psychiatric: She has a normal mood and affect  Judgment and thought content normal    Nursing note and vitals reviewed

## 2019-11-13 DIAGNOSIS — J30.2 SEASONAL ALLERGIC RHINITIS, UNSPECIFIED TRIGGER: ICD-10-CM

## 2019-11-13 RX ORDER — MONTELUKAST SODIUM 10 MG/1
TABLET ORAL
Qty: 90 TABLET | Refills: 0 | Status: SHIPPED | OUTPATIENT
Start: 2019-11-13 | End: 2021-07-02 | Stop reason: ALTCHOICE

## 2020-01-10 DIAGNOSIS — Z30.9 ENCOUNTER FOR CONTRACEPTIVE MANAGEMENT, UNSPECIFIED TYPE: ICD-10-CM

## 2020-01-11 RX ORDER — NORGESTIMATE AND ETHINYL ESTRADIOL 7DAYSX3 28
KIT ORAL
Qty: 84 TABLET | Refills: 0 | Status: SHIPPED | OUTPATIENT
Start: 2020-01-11 | End: 2020-03-30

## 2020-01-17 ENCOUNTER — OFFICE VISIT (OUTPATIENT)
Dept: PODIATRY | Facility: CLINIC | Age: 20
End: 2020-01-17
Payer: COMMERCIAL

## 2020-01-17 VITALS
WEIGHT: 128 LBS | HEIGHT: 64 IN | DIASTOLIC BLOOD PRESSURE: 64 MMHG | BODY MASS INDEX: 21.85 KG/M2 | HEART RATE: 80 BPM | SYSTOLIC BLOOD PRESSURE: 103 MMHG

## 2020-01-17 DIAGNOSIS — S90.212A CONTUSION OF LEFT GREAT TOE WITH DAMAGE TO NAIL, INITIAL ENCOUNTER: Primary | ICD-10-CM

## 2020-01-17 PROCEDURE — 3008F BODY MASS INDEX DOCD: CPT | Performed by: PODIATRIST

## 2020-01-17 PROCEDURE — 99202 OFFICE O/P NEW SF 15 MIN: CPT | Performed by: PODIATRIST

## 2020-01-17 RX ORDER — CLINDAMYCIN HYDROCHLORIDE 300 MG/1
CAPSULE ORAL
COMMUNITY
Start: 2019-12-23

## 2020-01-17 RX ORDER — SODIUM FLUORIDE 1.1 G/100G
GEL, DENTIFRICE ORAL
COMMUNITY
Start: 2019-12-17

## 2020-01-17 NOTE — PROGRESS NOTES
PATIENT:  Troy Lowery  2000       ASSESSMENT:     1  Contusion of left great toe with damage to nail, initial encounter               PLAN:  Patient was counseled and educated on the condition and the diagnosis  The diagnosis, treatment options and prognosis were discussed with the patient  She has dry hematoma on left great toenail without significant issue at this time  No need for further treatment  Instructed her to monitor for any pain, drainage, redness, or swelling  Discussed possible nail avulsion if there is any problem in the future  Subjective:   HPI  The patient presents with chief complaint of discoloration of right great toe  She had mild discoloration on left great toe with some tight shoes in the last Fall  Then, her lab partner stepped on her toe in October and it looked worse  Pain resolved after a few days  No pain at this time  Denied any swelling  No associated numbness or paresthesia  No significant weakness  The following portions of the patient's history were reviewed and updated as appropriate: allergies, current medications, past family history, past medical history, past social history, past surgical history and problem list   All pertinent labs and images were reviewed  Past Medical History  History reviewed  No pertinent past medical history  Past Surgical History  Past Surgical History:   Procedure Laterality Date    NO PAST SURGERIES          Allergies:  Patient has no known allergies      Medications:  Current Outpatient Medications   Medication Sig Dispense Refill    DENTA 5000 PLUS 1 1 % CREA BRUSH WITH SMALL AMOUNT AT BEDTIME      TRI-SPRINTEC 0 18/0 215/0 25 MG-35 MCG per tablet TAKE 1 TABLET BY MOUTH  DAILY 84 tablet 0    clindamycin (CLEOCIN) 300 MG capsule TAKE 1 CAPSULE BY MOUTH EVERY 8 HOURS FOR 10 DAYS      fluticasone (FLONASE) 50 mcg/act nasal spray 1 spray into each nostril daily (Patient not taking: Reported on 1/17/2020) 1 Bottle 2    montelukast (SINGULAIR) 10 mg tablet TAKE 1 TABLET BY MOUTH EVERY DAY (Patient not taking: Reported on 1/17/2020) 90 tablet 0     No current facility-administered medications for this visit  Social History:  Social History     Socioeconomic History    Marital status: Single     Spouse name: None    Number of children: None    Years of education: None    Highest education level: None   Occupational History    None   Social Needs    Financial resource strain: None    Food insecurity:     Worry: None     Inability: None    Transportation needs:     Medical: None     Non-medical: None   Tobacco Use    Smoking status: Never Smoker    Smokeless tobacco: Never Used   Substance and Sexual Activity    Alcohol use: Yes     Frequency: Monthly or less     Drinks per session: 3 or 4     Binge frequency: Less than monthly     Comment: Soc    Drug use: No    Sexual activity: None   Lifestyle    Physical activity:     Days per week: None     Minutes per session: None    Stress: None   Relationships    Social connections:     Talks on phone: None     Gets together: None     Attends [employer] witness service: None     Active member of club or organization: None     Attends meetings of clubs or organizations: None     Relationship status: None    Intimate partner violence:     Fear of current or ex partner: None     Emotionally abused: None     Physically abused: None     Forced sexual activity: None   Other Topics Concern    None   Social History Narrative    None          Review of Systems   Constitutional: Negative for chills and fever  Respiratory: Negative for cough and shortness of breath  Cardiovascular: Negative for chest pain and leg swelling  Gastrointestinal: Negative for nausea and vomiting  Musculoskeletal: Negative for gait problem  Skin: Positive for color change  Neurological: Negative for weakness and numbness  Hematological: Does not bruise/bleed easily  Objective:      /64   Pulse 80   Ht 5' 4" (1 626 m)   Wt 58 1 kg (128 lb)   BMI 21 97 kg/m²          Physical Exam   Constitutional: She is oriented to person, place, and time  She appears well-developed and well-nourished  No distress  HENT:   Head: Normocephalic and atraumatic  Neck: Normal range of motion  Neck supple  Cardiovascular: Normal rate, regular rhythm and intact distal pulses  Pulmonary/Chest: Effort normal and breath sounds normal  No respiratory distress  Musculoskeletal: Normal range of motion  She exhibits no edema or tenderness  Neurological: She is alert and oriented to person, place, and time  No cranial nerve deficit or sensory deficit  Skin: Capillary refill takes less than 2 seconds  No rash noted  No erythema  Dry hematoma on left great toe  Old nail plate is intact without onycholysis  New nail plate noted proximally  No pain on palpation  No drainage  No edema  No redness  Psychiatric: Her behavior is normal    Vitals reviewed

## 2020-03-29 DIAGNOSIS — Z30.9 ENCOUNTER FOR CONTRACEPTIVE MANAGEMENT, UNSPECIFIED TYPE: ICD-10-CM

## 2020-03-30 RX ORDER — NORGESTIMATE AND ETHINYL ESTRADIOL 7DAYSX3 28
KIT ORAL
Qty: 84 TABLET | Refills: 0 | Status: SHIPPED | OUTPATIENT
Start: 2020-03-30 | End: 2020-06-17

## 2020-06-17 DIAGNOSIS — Z30.9 ENCOUNTER FOR CONTRACEPTIVE MANAGEMENT, UNSPECIFIED TYPE: ICD-10-CM

## 2020-06-17 RX ORDER — NORGESTIMATE AND ETHINYL ESTRADIOL 7DAYSX3 28
KIT ORAL
Qty: 84 TABLET | Refills: 0 | Status: SHIPPED | OUTPATIENT
Start: 2020-06-17 | End: 2020-09-05 | Stop reason: SDUPTHER

## 2020-09-03 DIAGNOSIS — Z30.9 ENCOUNTER FOR CONTRACEPTIVE MANAGEMENT, UNSPECIFIED TYPE: ICD-10-CM

## 2020-09-05 RX ORDER — NORGESTIMATE AND ETHINYL ESTRADIOL 7DAYSX3 28
KIT ORAL
Qty: 84 TABLET | Refills: 3 | Status: SHIPPED | OUTPATIENT
Start: 2020-09-05 | End: 2020-09-28 | Stop reason: SDUPTHER

## 2020-09-28 DIAGNOSIS — Z30.9 ENCOUNTER FOR CONTRACEPTIVE MANAGEMENT, UNSPECIFIED TYPE: ICD-10-CM

## 2020-09-28 RX ORDER — NORGESTIMATE AND ETHINYL ESTRADIOL 7DAYSX3 28
1 KIT ORAL DAILY
Qty: 28 TABLET | Refills: 0 | Status: SHIPPED | OUTPATIENT
Start: 2020-09-28 | End: 2020-10-07 | Stop reason: SDUPTHER

## 2020-09-28 NOTE — TELEPHONE ENCOUNTER
Pt JASON stating that she misplaced 1 pack of her birth control pills   Pt would like 1 pack sent to [facility]

## 2020-10-07 DIAGNOSIS — Z30.9 ENCOUNTER FOR CONTRACEPTIVE MANAGEMENT, UNSPECIFIED TYPE: ICD-10-CM

## 2020-10-07 RX ORDER — NORGESTIMATE AND ETHINYL ESTRADIOL 7DAYSX3 28
1 KIT ORAL DAILY
Qty: 84 TABLET | Refills: 0 | Status: SHIPPED | OUTPATIENT
Start: 2020-10-07 | End: 2020-10-20

## 2020-10-20 DIAGNOSIS — Z30.9 ENCOUNTER FOR CONTRACEPTIVE MANAGEMENT, UNSPECIFIED TYPE: ICD-10-CM

## 2020-10-20 RX ORDER — NORGESTIMATE AND ETHINYL ESTRADIOL 7DAYSX3 28
KIT ORAL
Qty: 28 TABLET | Refills: 3 | Status: SHIPPED | OUTPATIENT
Start: 2020-10-20 | End: 2020-12-05

## 2020-10-27 ENCOUNTER — TELEPHONE (OUTPATIENT)
Dept: FAMILY MEDICINE CLINIC | Facility: CLINIC | Age: 20
End: 2020-10-27

## 2020-12-04 DIAGNOSIS — Z30.9 ENCOUNTER FOR CONTRACEPTIVE MANAGEMENT, UNSPECIFIED TYPE: ICD-10-CM

## 2020-12-05 RX ORDER — NORGESTIMATE AND ETHINYL ESTRADIOL 7DAYSX3 28
KIT ORAL
Qty: 84 TABLET | Refills: 3 | Status: SHIPPED | OUTPATIENT
Start: 2020-12-05 | End: 2021-10-01

## 2021-07-02 ENCOUNTER — OFFICE VISIT (OUTPATIENT)
Dept: FAMILY MEDICINE CLINIC | Facility: CLINIC | Age: 21
End: 2021-07-02
Payer: COMMERCIAL

## 2021-07-02 VITALS
SYSTOLIC BLOOD PRESSURE: 110 MMHG | HEIGHT: 65 IN | HEART RATE: 87 BPM | TEMPERATURE: 98.4 F | BODY MASS INDEX: 21.99 KG/M2 | WEIGHT: 132 LBS | OXYGEN SATURATION: 98 % | RESPIRATION RATE: 14 BRPM | DIASTOLIC BLOOD PRESSURE: 70 MMHG

## 2021-07-02 DIAGNOSIS — Z13.29 SCREENING FOR THYROID DISORDER: ICD-10-CM

## 2021-07-02 DIAGNOSIS — Z23 NEED FOR TDAP VACCINATION: ICD-10-CM

## 2021-07-02 DIAGNOSIS — Z13.1 SCREENING FOR DIABETES MELLITUS: ICD-10-CM

## 2021-07-02 DIAGNOSIS — Z00.00 ANNUAL PHYSICAL EXAM: Primary | ICD-10-CM

## 2021-07-02 DIAGNOSIS — Z76.89 ENCOUNTER TO ESTABLISH CARE: ICD-10-CM

## 2021-07-02 DIAGNOSIS — Z13.220 SCREENING FOR LIPID DISORDERS: ICD-10-CM

## 2021-07-02 PROCEDURE — 99395 PREV VISIT EST AGE 18-39: CPT | Performed by: NURSE PRACTITIONER

## 2021-07-02 PROCEDURE — 3725F SCREEN DEPRESSION PERFORMED: CPT | Performed by: NURSE PRACTITIONER

## 2021-07-02 PROCEDURE — 1036F TOBACCO NON-USER: CPT | Performed by: NURSE PRACTITIONER

## 2021-07-02 PROCEDURE — 3008F BODY MASS INDEX DOCD: CPT | Performed by: NURSE PRACTITIONER

## 2021-07-02 NOTE — PATIENT INSTRUCTIONS

## 2021-07-02 NOTE — PROGRESS NOTES
ADULT ANNUAL [address]  GROUP    NAME: Christine Brannon  AGE: 23 y o  SEX: female  : 2000     DATE: 2021     Assessment and Plan:   Comprehensive physical exam   paperwork to be filled out today for employment  Patient is applying for substitute teaching  Recently graduated from [employer]  Certified to reach 0-27 science  no healthcare concerns today  Past medical history and immunizations reviewed  Patient is due for her Tdap -last in   She is receiving the 2nd COVID vaccine next week  She is aware to schedule for her Tdap greater than 2 weeks after her 2nd COVID vaccine  She will also get her ppd completed at that same time  She is aware to return 48-72 hours after for read  orders for  Fasting screening labs: Thyroid, diabetes, lipids    Due for 1st gyn/Pap  Needs to establish  Referral placed for in network provider today  Recommend annual physicals  Return sooner as needed  Problem List Items Addressed This Visit     None      Visit Diagnoses     Annual physical exam    -  Primary    Need for Tdap vaccination        Encounter to establish care        Relevant Orders    Ambulatory referral to Gynecology    BMI 22 0-22 9, adult        Screening for lipid disorders        Relevant Orders    Lipid panel    Screening for diabetes mellitus        Relevant Orders    Comprehensive metabolic panel    Screening for thyroid disorder        Relevant Orders    TSH, 3rd generation with Free T4 reflex          Immunizations and preventive care screenings were discussed with patient today  Appropriate education was printed on patient's after visit summary  Counseling:  Alcohol/drug use: discussed moderation in alcohol intake, the recommendations for healthy alcohol use, and avoidance of illicit drug use  Social alcohol  Occasional social vaping    Recreational marijuana  Dental Health: discussed importance of regular tooth brushing, flossing, and dental visits  Injury prevention: discussed safety/seat belts, safety helmets, smoke detectors, carbon dioxide detectors, and smoking near bedding or upholstery  Sexual health: discussed sexually transmitted diseases, partner selection, use of condoms, avoidance of unintended pregnancy, and contraceptive alternatives  · Exercise: the importance of regular exercise/physical activity was discussed  Recommend exercise 3-5 times per week for at least 30 minutes  Depression Screening and Follow-up Plan: Patient's depression screening was positive with a PHQ-2 score of 0  Clincally patient does not have depression  No treatment is required  Patient does have remote history of depression/ suicidal attempt  Patient feels mentally healthy today  Denies any desire to self-harm or harm others  Feels safe at home        Return in about 1 year (around 7/2/2022) for Annual physical      Chief Complaint:     Chief Complaint   Patient presents with    Physical Exam      History of Present Illness:     Adult Annual Physical   Patient here for a comprehensive physical exam  The patient reports no problems  Diet and Physical Activity  · Diet/Nutrition: well balanced diet, limited junk food and consuming 3-5 servings of fruits/vegetables daily  · Exercise: Patient does not do any formal exercise program, but does hike weekly  daily physical/cardio exercise encouraged  Depression Screening  PHQ-9 Depression Screening    PHQ-9:   Frequency of the following problems over the past two weeks:      Little interest or pleasure in doing things: 0 - not at all  Feeling down, depressed, or hopeless: 0 - not at all  PHQ-2 Score: 0       General Health  · Sleep: sleeps well  · Hearing: normal - bilateral   · Vision: no vision problems, goes for regular eye exams, most recent eye exam >1 year ago and wears glasses and contacts     · Dental: regular dental visits and Goes to the dentist every 6 months for routine care  /GYN Health  · Last menstrual period: 6/30  · Contraceptive method: oral contraceptives  But is interested in possible IUD  Referral to gyn as above  · History of STDs?: no  In a monogamous male relationship  Was recently tested for  GC chlamydia: Negative  Denies need for further STD testing today  Review of Systems:     Review of Systems   Constitutional: Negative  HENT: Negative  Respiratory: Negative  Cardiovascular: Negative  Gastrointestinal: Negative  Genitourinary: Negative  Musculoskeletal: Negative  Skin: Negative  Occasional red bumps, in axial area  None today   Neurological: Negative  Psychiatric/Behavioral: Negative  Past Medical History:     History reviewed  No pertinent past medical history  Past Surgical History:     Past Surgical History:   Procedure Laterality Date    NO PAST SURGERIES        Social History:     Social History     Socioeconomic History    Marital status: Single     Spouse name: None    Number of children: None    Years of education: None    Highest education level: None   Occupational History    None   Tobacco Use    Smoking status: Never Smoker    Smokeless tobacco: Never Used   Substance and Sexual Activity    Alcohol use: Yes     Comment: Soc    Drug use: No    Sexual activity: None   Other Topics Concern    None   Social History Narrative    None     Social Determinants of Health     Financial Resource Strain:     Difficulty of Paying Living Expenses:    Food Insecurity:     Worried About Running Out of Food in the Last Year:     Ran Out of Food in the Last Year:    Transportation Needs:     Lack of Transportation (Medical):      Lack of Transportation (Non-Medical):    Physical Activity:     Days of Exercise per Week:     Minutes of Exercise per Session:    Stress:     Feeling of Stress :    Social Connections:     Frequency of Communication with Friends and Family:  Frequency of Social Gatherings with Friends and Family:     Attends [employer] Services:     Active Member of Clubs or Organizations:     Attends Club or Organization Meetings:     Marital Status:    Intimate Partner Violence:     Fear of Current or Ex-Partner:     Emotionally Abused:     Physically Abused:     Sexually Abused:       Family History:     Family History   Problem Relation Age of Onset    No Known Problems Mother     No Known Problems Father       Current Medications:     Current Outpatient Medications   Medication Sig Dispense Refill    clindamycin (CLEOCIN) 300 MG capsule TAKE 1 CAPSULE BY MOUTH EVERY 8 HOURS FOR 10 DAYS      DENTA 5000 PLUS 1 1 % CREA BRUSH WITH SMALL AMOUNT AT BEDTIME      Tri-Estarylla 0 18/0 215/0 25 MG-35 MCG per tablet TAKE 1 TABLET BY MOUTH  DAILY 84 tablet 3     No current facility-administered medications for this visit  Allergies:     No Known Allergies   Physical Exam:     /70 (BP Location: Right arm, Patient Position: Sitting, Cuff Size: Adult)   Pulse 87   Temp 98 4 °F (36 9 °C) (Tympanic)   Resp 14   Ht 5' 4 57" (1 64 m)   Wt 59 9 kg (132 lb)   LMP 06/30/2021   SpO2 98%   BMI 22 26 kg/m²     Physical Exam  Vitals and nursing note reviewed  Constitutional:       General: She is not in acute distress  Appearance: Normal appearance  She is well-developed and well-groomed  She is not ill-appearing  HENT:      Head: Normocephalic and atraumatic  Right Ear: Hearing, tympanic membrane, ear canal and external ear normal       Left Ear: Hearing, tympanic membrane, ear canal and external ear normal       Nose: Nose normal       Mouth/Throat:      Mouth: Mucous membranes are moist       Pharynx: Oropharynx is clear  Eyes:      Extraocular Movements: Extraocular movements intact  Conjunctiva/sclera: Conjunctivae normal       Pupils: Pupils are equal, round, and reactive to light  Neck:      Vascular: No carotid bruit  Cardiovascular:      Rate and Rhythm: Normal rate and regular rhythm  Pulses:           Posterior tibial pulses are 2+ on the right side and 2+ on the left side  Heart sounds: Normal heart sounds  Pulmonary:      Effort: Pulmonary effort is normal  No respiratory distress  Breath sounds: Normal breath sounds and air entry  Abdominal:      General: Abdomen is flat  Bowel sounds are normal       Palpations: Abdomen is soft  Tenderness: There is no abdominal tenderness  Musculoskeletal:      Right lower leg: No edema  Left lower leg: No edema  Lymphadenopathy:      Cervical: No cervical adenopathy  Skin:     General: Skin is warm and dry  Neurological:      General: No focal deficit present  Mental Status: She is alert and oriented to person, place, and time  Cranial Nerves: No cranial nerve deficit  Sensory: No sensory deficit  Motor: No weakness  Coordination: Coordination normal       Gait: Gait normal    Psychiatric:         Attention and Perception: Attention normal          Mood and Affect: Mood and affect normal          Speech: Speech normal          Behavior: Behavior normal          Thought Content:  Thought content normal           EDGAR Strong   [facility]

## 2021-08-10 ENCOUNTER — CLINICAL SUPPORT (OUTPATIENT)
Dept: FAMILY MEDICINE CLINIC | Facility: CLINIC | Age: 21
End: 2021-08-10
Payer: COMMERCIAL

## 2021-08-10 DIAGNOSIS — Z23 NEED FOR VACCINATION: Primary | ICD-10-CM

## 2021-08-10 PROCEDURE — 86580 TB INTRADERMAL TEST: CPT | Performed by: NURSE PRACTITIONER

## 2021-08-12 ENCOUNTER — CLINICAL SUPPORT (OUTPATIENT)
Dept: FAMILY MEDICINE CLINIC | Facility: CLINIC | Age: 21
End: 2021-08-12
Payer: COMMERCIAL

## 2021-08-12 DIAGNOSIS — Z23 NEED FOR VACCINATION: Primary | ICD-10-CM

## 2021-08-12 LAB
INDURATION: 0 MM
TB SKIN TEST: NEGATIVE

## 2021-08-12 PROCEDURE — 90715 TDAP VACCINE 7 YRS/> IM: CPT | Performed by: NURSE PRACTITIONER

## 2021-08-12 PROCEDURE — 90471 IMMUNIZATION ADMIN: CPT | Performed by: NURSE PRACTITIONER

## 2021-09-30 DIAGNOSIS — Z30.9 ENCOUNTER FOR CONTRACEPTIVE MANAGEMENT, UNSPECIFIED TYPE: ICD-10-CM

## 2021-10-01 RX ORDER — NORGESTIMATE AND ETHINYL ESTRADIOL 7DAYSX3 28
KIT ORAL
Qty: 84 TABLET | Refills: 3 | Status: SHIPPED | OUTPATIENT
Start: 2021-10-01 | End: 2021-11-24 | Stop reason: SDUPTHER

## 2021-11-24 DIAGNOSIS — Z30.9 ENCOUNTER FOR CONTRACEPTIVE MANAGEMENT, UNSPECIFIED TYPE: ICD-10-CM

## 2021-11-24 RX ORDER — NORGESTIMATE AND ETHINYL ESTRADIOL 7DAYSX3 28
1 KIT ORAL DAILY
Qty: 84 TABLET | Refills: 1 | Status: SHIPPED | OUTPATIENT
Start: 2021-11-24 | End: 2022-05-23

## 2022-02-24 LAB
EXTERNAL CHLAMYDIA RESULT: NEGATIVE
N GONORRHOEA RRNA SPEC QL PROBE: NORMAL

## 2022-04-09 ENCOUNTER — OFFICE VISIT (OUTPATIENT)
Dept: URGENT CARE | Facility: CLINIC | Age: 22
End: 2022-04-09
Payer: COMMERCIAL

## 2022-04-09 VITALS
HEART RATE: 89 BPM | SYSTOLIC BLOOD PRESSURE: 119 MMHG | WEIGHT: 130 LBS | HEIGHT: 64 IN | BODY MASS INDEX: 22.2 KG/M2 | OXYGEN SATURATION: 98 % | DIASTOLIC BLOOD PRESSURE: 68 MMHG | TEMPERATURE: 98.9 F | RESPIRATION RATE: 18 BRPM

## 2022-04-09 DIAGNOSIS — H18.822 CORNEAL ABRASION DUE TO CONTACT LENS, LEFT: Primary | ICD-10-CM

## 2022-04-09 PROCEDURE — S9083 URGENT CARE CENTER GLOBAL: HCPCS | Performed by: PHYSICIAN ASSISTANT

## 2022-04-09 PROCEDURE — G0382 LEV 3 HOSP TYPE B ED VISIT: HCPCS | Performed by: PHYSICIAN ASSISTANT

## 2022-04-09 RX ORDER — GENTAMICIN SULFATE 3 MG/ML
2 SOLUTION/ DROPS OPHTHALMIC 4 TIMES DAILY
Qty: 5 ML | Refills: 0 | Status: SHIPPED | OUTPATIENT
Start: 2022-04-09 | End: 2022-04-16

## 2022-04-09 NOTE — PROGRESS NOTES
[facility] [employer] [facility]        NAME: Graciela Eagle is a 19 y o  female  : 2000    MRN: [id number]  DATE: 2022  TIME: 12:57 PM    Assessment and Plan   Corneal abrasion due to contact lens, left [H18 822]  1  Corneal abrasion due to contact lens, left  gentamicin (GARAMYCIN) 0 3 % ophthalmic solution         Patient Instructions       Follow up with PCP as needed  Utilize drops for 1 week  Chief Complaint     Chief Complaint   Patient presents with    Eye Problem     started on thrusday, pt noticed that contact was dry and bithering her throughout the day, left eye is draining white/clear draiange, slight redness in corner of eye, slight pain with eye movement, no issues with vision, eye drops used to flush which burned eye         History of Present Illness       Patient with 2 day history of left eye discomfort  She had difficulty with her contact and it was dry  She removed it and has been wearing her glasses but she notice her eye is red and still irritated  Eye Pain   The left eye is affected  This is a new problem  The problem occurs constantly  The problem has been gradually worsening  The injury mechanism was contact lenses  The pain is mild  There is no known exposure to pink eye  She wears contacts  Associated symptoms include eye redness, a foreign body sensation and photophobia  Pertinent negatives include no blurred vision, eye discharge, double vision, fever, itching, nausea, recent URI or vomiting  She has tried commercial eye wash for the symptoms  The treatment provided mild relief  Review of Systems   Review of Systems   Constitutional: Negative for fever  Eyes: Positive for photophobia, pain and redness  Negative for blurred vision, double vision, discharge and itching  Gastrointestinal: Negative for nausea and vomiting  All other systems reviewed and are negative          Current Medications       Current Outpatient Medications:     norgestimate-ethinyl estradiol (Tri-Estarylla) 0 18/0 215/0 25 MG-35 MCG per tablet, Take 1 tablet by mouth daily, Disp: 84 tablet, Rfl: 1    clindamycin (CLEOCIN) 300 MG capsule, TAKE 1 CAPSULE BY MOUTH EVERY 8 HOURS FOR 10 DAYS (Patient not taking: Reported on 4/9/2022), Disp: , Rfl:     DENTA 5000 PLUS 1 1 % CREA, BRUSH WITH SMALL AMOUNT AT BEDTIME (Patient not taking: Reported on 4/9/2022), Disp: , Rfl:     gentamicin (GARAMYCIN) 0 3 % ophthalmic solution, Administer 2 drops into the left eye 4 (four) times a day for 7 days, Disp: 5 mL, Rfl: 0    Current Allergies     Allergies as of 04/09/2022    (No Known Allergies)            The following portions of the patient's history were reviewed and updated as appropriate: allergies, current medications, past family history, past medical history, past social history, past surgical history and problem list      History reviewed  No pertinent past medical history  Past Surgical History:   Procedure Laterality Date    NO PAST SURGERIES         Family History   Problem Relation Age of Onset    No Known Problems Mother     No Known Problems Father          Medications have been verified  Objective   /68   Pulse 89   Temp 98 9 °F (37 2 °C) (Tympanic)   Resp 18   Ht 5' 4" (1 626 m)   Wt 59 kg (130 lb)   SpO2 98%   BMI 22 31 kg/m²   No LMP recorded  (Menstrual status: Birth Control)  Physical Exam     Physical Exam  Vitals and nursing note reviewed  Constitutional:       Appearance: Normal appearance  She is normal weight  Eyes:      Extraocular Movements: Extraocular movements intact  Pupils: Pupils are equal, round, and reactive to light  Cardiovascular:      Rate and Rhythm: Normal rate and regular rhythm  Pulses: Normal pulses  Heart sounds: Normal heart sounds  Pulmonary:      Effort: Pulmonary effort is normal       Breath sounds: Normal breath sounds  Neurological:      Mental Status: She is alert           Left eye conjunctival irritation, fluorescein stain and tetracaine was utilized and then fully irrigated  There was a corneal abrasion at 12:00 p m  and 3:00 a m  Angel Garsia

## 2022-04-09 NOTE — PATIENT INSTRUCTIONS
Corneal Abrasion   WHAT YOU NEED TO KNOW:   A corneal abrasion is a scratch on the cornea of your eye  The cornea is the clear layer that covers the front of your eye  A small scratch may heal in 1 to 2 days  Deeper or larger scratches may take longer to heal         DISCHARGE INSTRUCTIONS:   Call your doctor or ophthalmologist if:   · Your eye pain or vision gets worse  · You have yellow or green drainage from your eye  · You have questions or concerns about your condition or care  Medicines:   · Medicines  may be given in the form of eyedrops or ointment to help prevent an eye infection  You may also be given eyedrops to decrease pain  · Take your medicine as directed  Contact your healthcare provider if you think your medicine is not helping or if you have side effects  Tell him or her if you are allergic to any medicine  Keep a list of the medicines, vitamins, and herbs you take  Include the amounts, and when and why you take them  Bring the list or the pill bottles to follow-up visits  Carry your medicine list with you in case of an emergency  Care for your eyes:   · Get regular eye exams  Get your eyes checked at least every year  · Eat healthy foods  Fresh fruits and vegetables that are rich in vitamins A and C may help with your vision  Foods such as sweet potatoes, apricots, and carrots are rich in nutrients for the eyes  · Take care of your contacts or glasses  Store, clean, and use your contacts or glasses as directed  Replace your glasses or contact lenses as often as your healthcare provider suggests  · Decrease eye strain  Rest your eyes, especially after you read or use a computer for long periods of time  Get plenty of sleep at night  Use lights that reduce glare in your home, school, or workplace  · Wear dark sunglasses  This will help prevent pain and light sensitivity  Make sure the sunglasses have UVA and UVB protection   This will protect your eyes when you go outside  · Use eyedrops safely  If your treatment plan includes eyedrops, it is important to use them as directed  Your provider may give you detailed instructions to follow  The eyedrops may also come with safety instructions  Follow all instructions to help prevent an infection  Do not touch the tip of the bottle to your eye  Germs from your eye can spread to the medicine bottle  Help prevent corneal abrasions:   · Remove your contact lenses if your eyes feel dry or irritated  · Wash your hands if you need to touch your eyes or your face  · Trim your fingernails so you cannot scratch your eye  · Wear protective eyewear when you work with chemicals, wood, dust, or metal     · Wear protective eyewear when you play sports  · Do not wear your contacts for longer than you should  · Do not sleep with your contacts in  · Do not wear glitter makeup  Glitter can easily get into your eyes and under contact lenses  Follow up with your doctor or ophthalmologist as directed:  Write down your questions so you remember to ask them during your visits  © Copyright [employer] 2022 Information is for End User's use only and may not be sold, redistributed or otherwise used for commercial purposes  All illustrations and images included in CareNotes® are the copyrighted property of [employer]  or [facility] [place] [facility]   The above information is an  only  It is not intended as medical advice for individual conditions or treatments  Talk to your doctor, nurse or pharmacist before following any medical regimen to see if it is safe and effective for you

## 2022-05-22 DIAGNOSIS — Z30.9 ENCOUNTER FOR CONTRACEPTIVE MANAGEMENT, UNSPECIFIED TYPE: ICD-10-CM

## 2022-05-23 RX ORDER — NORGESTIMATE AND ETHINYL ESTRADIOL 7DAYSX3 28
KIT ORAL
Qty: 28 TABLET | Refills: 5 | Status: SHIPPED | OUTPATIENT
Start: 2022-05-23 | End: 2022-08-10

## 2022-08-10 DIAGNOSIS — Z30.9 ENCOUNTER FOR CONTRACEPTIVE MANAGEMENT, UNSPECIFIED TYPE: ICD-10-CM

## 2022-08-10 RX ORDER — NORGESTIMATE AND ETHINYL ESTRADIOL 7DAYSX3 28
KIT ORAL
Qty: 84 TABLET | Refills: 0 | Status: SHIPPED | OUTPATIENT
Start: 2022-08-10 | End: 2022-08-16 | Stop reason: SDUPTHER

## 2022-08-16 ENCOUNTER — OFFICE VISIT (OUTPATIENT)
Dept: FAMILY MEDICINE CLINIC | Facility: CLINIC | Age: 22
End: 2022-08-16
Payer: COMMERCIAL

## 2022-08-16 VITALS
HEART RATE: 84 BPM | TEMPERATURE: 99.1 F | WEIGHT: 134 LBS | DIASTOLIC BLOOD PRESSURE: 58 MMHG | OXYGEN SATURATION: 99 % | BODY MASS INDEX: 22.33 KG/M2 | HEIGHT: 65 IN | SYSTOLIC BLOOD PRESSURE: 102 MMHG

## 2022-08-16 DIAGNOSIS — Z13.29 SCREENING FOR THYROID DISORDER: ICD-10-CM

## 2022-08-16 DIAGNOSIS — Z00.00 ANNUAL PHYSICAL EXAM: Primary | ICD-10-CM

## 2022-08-16 DIAGNOSIS — Z13.220 SCREENING FOR LIPID DISORDERS: ICD-10-CM

## 2022-08-16 DIAGNOSIS — Z30.9 ENCOUNTER FOR CONTRACEPTIVE MANAGEMENT, UNSPECIFIED TYPE: ICD-10-CM

## 2022-08-16 DIAGNOSIS — Z13.1 SCREENING FOR DIABETES MELLITUS: ICD-10-CM

## 2022-08-16 DIAGNOSIS — Z86.2 HISTORY OF ANEMIA: ICD-10-CM

## 2022-08-16 PROCEDURE — 99395 PREV VISIT EST AGE 18-39: CPT | Performed by: NURSE PRACTITIONER

## 2022-08-16 PROCEDURE — 3725F SCREEN DEPRESSION PERFORMED: CPT | Performed by: NURSE PRACTITIONER

## 2022-08-16 RX ORDER — NORGESTIMATE AND ETHINYL ESTRADIOL 7DAYSX3 28
1 KIT ORAL DAILY
Qty: 84 TABLET | Refills: 1 | Status: SHIPPED | OUTPATIENT
Start: 2022-08-16

## 2022-08-16 NOTE — PATIENT INSTRUCTIONS

## 2022-08-16 NOTE — PROGRESS NOTES
ADULT ANNUAL [address] [facility]    NAME: Lawrence Peng  AGE: 19 y o  SEX: female  : 2000     DATE: 2022     Assessment and Plan:   Comprehensive physical exam  PMH and chronic conditions reviewed  Medications reconciled  Preventative care and recommended screenings as below  Annual physicals  Follow-up sooner as needed  Problem List Items Addressed This Visit    None     Visit Diagnoses     Annual physical exam    -  Primary    History of anemia        Relevant Orders    Iron Panel (Includes Ferritin, Iron Sat%, Iron, and TIBC)    Screening for lipid disorders        Relevant Orders    Lipid panel    Screening for diabetes mellitus        Relevant Orders    Comprehensive metabolic panel    Screening for thyroid disorder        Relevant Orders    TSH, 3rd generation with Free T4 reflex    Encounter for contraceptive management, unspecified type        Relevant Medications    norgestimate-ethinyl estradiol (Tri Femynor) 0 18/0 215/0 25 MG-35 MCG per tablet          Immunizations and preventive care screenings were discussed with patient today  Appropriate education was printed on patient's after visit summary  Counseling:  Alcohol/drug use: discussed moderation in alcohol intake, the recommendations for healthy alcohol use, and avoidance of illicit drug use  Dental Health: discussed importance of regular tooth brushing, flossing, and dental visits  Injury prevention: discussed safety/seat belts, safety helmets, smoke detectors, carbon dioxide detectors, and smoking near bedding or upholstery  Sexual health: discussed sexually transmitted diseases, partner selection, use of condoms, avoidance of unintended pregnancy, and contraceptive alternatives  · Exercise: the importance of regular exercise/physical activity was discussed  Recommend exercise 3-5 times per week for at least 30 minutes  BMI Counseling:  Body mass index is 22 65 kg/m²  The BMI is above normal  Nutrition recommendations include consuming healthier snacks  Exercise recommendations include moderate physical activity 150 minutes/week  Rationale for BMI follow-up plan is due to patient being overweight or obese  Healthy lifestyle encouraged    Depression Screening and Follow-up Plan: Patient was screened for depression during today's encounter  They screened negative with a PHQ-2 score of 0  Return in about 1 year (around 8/16/2023) for Annual physical      Chief Complaint:     Chief Complaint   Patient presents with    Physical Exam     Physical exam, would like to discuss birth control      History of Present Illness:     Adult Annual Physical   Patient here for a comprehensive physical exam  The patient reports no problems  Diet and Physical Activity  · Diet/Nutrition: well balanced diet  · Exercise: walking, moderate cardiovascular exercise and 3-4 times a week on average  Depression Screening  PHQ-2/9 Depression Screening    Little interest or pleasure in doing things: 0 - not at all  Feeling down, depressed, or hopeless: 0 - not at all  PHQ-2 Score: 0  PHQ-2 Interpretation: Negative depression screen       General Health  · Sleep: sleeps well  · Hearing: normal - bilateral   · Vision: no vision problems, goes for regular eye exams, most recent eye exam >1 year ago, wears glasses and Snellen: 20/25 OU w/ correction  · Dental: regular dental visits  /GYN Health  · Last menstrual period: 7/19  No excessive clotting/cramping  Does notes some PMS type symptoms when pharmacy gives her different manufacturers  Will request for same  on next prescription  · Contraceptive method: oral contraceptives  · Discussed alternate birth control options  Considering IUD     · History of STDs?: no  Denies need  · Monogamous relationship  · PAP (-) 2/2022  · Discussed self breast exams - technique reviewed     Review of Systems:     Review of Systems Constitutional: Negative  HENT: Negative  Respiratory: Negative  Cardiovascular: Negative  Gastrointestinal: Negative  Genitourinary: Negative  Musculoskeletal: Negative  Skin: Negative  Neurological: Negative  Psychiatric/Behavioral: Negative  Past Medical History:     History reviewed  No pertinent past medical history  Past Surgical History:     Past Surgical History:   Procedure Laterality Date    NO PAST SURGERIES        Social History:     Social History     Socioeconomic History    Marital status: Single     Spouse name: None    Number of children: None    Years of education: None    Highest education level: None   Occupational History    None   Tobacco Use    Smoking status: Never Smoker    Smokeless tobacco: Never Used   Substance and Sexual Activity    Alcohol use: Yes     Comment: Soc    Drug use: No    Sexual activity: None   Other Topics Concern    None   Social History Narrative    None     Social Determinants of Health     Financial Resource Strain: Not on file   Food Insecurity: Not on file   Transportation Needs: Not on file   Physical Activity: Not on file   Stress: Not on file   Social Connections: Not on file   Intimate Partner Violence: Not on file   Housing Stability: Not on file      Family History:     Family History   Problem Relation Age of Onset    No Known Problems Mother     No Known Problems Father       Current Medications:     Current Outpatient Medications   Medication Sig Dispense Refill    norgestimate-ethinyl estradiol (Tri Femynor) 0 18/0 215/0 25 MG-35 MCG per tablet Take 1 tablet by mouth daily 84 tablet 1    clindamycin (CLEOCIN) 300 MG capsule TAKE 1 CAPSULE BY MOUTH EVERY 8 HOURS FOR 10 DAYS (Patient not taking: Reported on 4/9/2022)      DENTA 5000 PLUS 1 1 % CREA BRUSH WITH SMALL AMOUNT AT BEDTIME (Patient not taking: Reported on 4/9/2022)       No current facility-administered medications for this visit  Allergies:     No Known Allergies   Physical Exam:     /58 (BP Location: Right arm, Patient Position: Sitting, Cuff Size: Adult)   Pulse 84   Temp 99 1 °F (37 3 °C)   Ht 5' 4 5" (1 638 m)   Wt 60 8 kg (134 lb)   LMP 07/19/2022 (Approximate)   SpO2 99%   BMI 22 65 kg/m²     Physical Exam  Vitals and nursing note reviewed  Constitutional:       General: She is not in acute distress  Appearance: Normal appearance  She is well-developed and well-groomed  She is not ill-appearing  HENT:      Head: Normocephalic  Right Ear: Tympanic membrane, ear canal and external ear normal       Left Ear: Tympanic membrane, ear canal and external ear normal       Nose: Nose normal       Mouth/Throat:      Mouth: Mucous membranes are moist       Pharynx: Oropharynx is clear  Eyes:      Conjunctiva/sclera: Conjunctivae normal       Pupils: Pupils are equal, round, and reactive to light  Neck:      Thyroid: No thyromegaly  Vascular: No carotid bruit  Cardiovascular:      Rate and Rhythm: Normal rate and regular rhythm  Pulses:           Posterior tibial pulses are 2+ on the right side and 2+ on the left side  Heart sounds: Normal heart sounds  Pulmonary:      Effort: Pulmonary effort is normal  No respiratory distress  Breath sounds: Normal breath sounds and air entry  Abdominal:      General: Bowel sounds are normal       Palpations: Abdomen is soft  Tenderness: There is no abdominal tenderness  Musculoskeletal:      Right lower leg: No edema  Left lower leg: No edema  Lymphadenopathy:      Cervical: No cervical adenopathy  Skin:     General: Skin is warm and dry  Neurological:      General: No focal deficit present  Mental Status: She is alert and oriented to person, place, and time  Psychiatric:         Attention and Perception: Attention normal          Mood and Affect: Mood normal          Behavior: Behavior normal          Thought Content:  Thought content normal          Judgment: Judgment normal           EDGAR Walters    [address] [facility] [address]

## 2022-08-17 ENCOUNTER — TELEPHONE (OUTPATIENT)
Dept: ADMINISTRATIVE | Facility: OTHER | Age: 22
End: 2022-08-17

## 2022-08-17 NOTE — TELEPHONE ENCOUNTER
----- Message from Sahara Carmona sent at 8/16/2022  2:31 PM EDT -----  Regarding: Care Gap Request  08/16/22 2:31 PM    Lakshmi, our patient Jacinta Rivas has had Pap Smear (HPV) aka Cervical Cancer Screening completed/performed  Please assist in updating the patient chart by pulling the Care Everywhere (CE) document  The date of service is 2/24/22       Thank you,  Janna Macias MA  [facility]

## 2022-08-17 NOTE — TELEPHONE ENCOUNTER
Upon review of the In Basket request we were able to locate, review, and update the patient chart as requested for Chlamydia and Pap Smear (HPV) aka Cervical Cancer Screening  Any additional questions or concerns should be emailed to the Practice Liaisons via [email or web address] email, please do not reply via In Basket      Thank you  Kwame Persaud MA

## 2022-08-17 NOTE — TELEPHONE ENCOUNTER
Upon review of the In Basket request we were able to locate, review, and update the patient chart as requested for Pap Smear (HPV) aka Cervical Cancer Screening  Any additional questions or concerns should be emailed to the Practice Liaisons via [email or web address] email, please do not reply via In Basket      Thank you  Tony Beckman MA

## 2022-11-10 ENCOUNTER — OFFICE VISIT (OUTPATIENT)
Dept: FAMILY MEDICINE CLINIC | Facility: CLINIC | Age: 22
End: 2022-11-10

## 2022-11-10 VITALS
DIASTOLIC BLOOD PRESSURE: 56 MMHG | TEMPERATURE: 98.9 F | SYSTOLIC BLOOD PRESSURE: 90 MMHG | HEIGHT: 65 IN | OXYGEN SATURATION: 98 % | WEIGHT: 133.8 LBS | BODY MASS INDEX: 22.29 KG/M2 | HEART RATE: 88 BPM

## 2022-11-10 DIAGNOSIS — J06.9 UPPER RESPIRATORY TRACT INFECTION, UNSPECIFIED TYPE: Primary | ICD-10-CM

## 2022-11-10 DIAGNOSIS — R49.9 VOICE COMPLAINT: ICD-10-CM

## 2022-11-10 RX ORDER — AMOXICILLIN AND CLAVULANATE POTASSIUM 875; 125 MG/1; MG/1
1 TABLET, FILM COATED ORAL EVERY 12 HOURS SCHEDULED
Qty: 14 TABLET | Refills: 0 | Status: SHIPPED | OUTPATIENT
Start: 2022-11-10 | End: 2022-11-17

## 2022-11-10 NOTE — ASSESSMENT & PLAN NOTE
Unclear cause of pt recurrent voice changes  Allergies could contribute  With her recurrent episodes, would recommend evaluation with ENT  Pt agreeable and referral placed

## 2022-11-10 NOTE — PROGRESS NOTES
[facility]    ASSESSMENT AND PLAN     1  Upper respiratory tract infection, unspecified type  Assessment & Plan:  Acute URI; abx as ordered; advised on supportive care; f/u guidance given should sx not improve      Orders:  -     Ambulatory Referral to Otolaryngology; Future  -     amoxicillin-clavulanate (Augmentin) 875-125 mg per tablet; Take 1 tablet by mouth every 12 (twelve) hours for 7 days    2  Voice complaint  Assessment & Plan:  Unclear cause of pt recurrent voice changes  Allergies could contribute  With her recurrent episodes, would recommend evaluation with ENT  Pt agreeable and referral placed  Orders:  -     Ambulatory Referral to Otolaryngology; Future         SUBJECTIVE       Patient ID: Carlyn Deshpande is a 19 y o  female  Chief Complaint   Patient presents with   • Laryngitis   • Earache     right   • Sore Throat       HISTORY OF PRESENT ILLNESS    Presents with URI symptoms  Started yesterday  Similar symptoms one month ago  - took 1-2 weeks to resolve  Prior to that was June, February and fall last year  States starts as scratchy throat, ear pain/fullness, headache, fatigue and she looses her voice  Her voice gets scratchy and notes pain with trying to voice  Feels like she can not speak in different tones  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)  Worsens with continued talking  Unsure if her throat is red when occurring  No fevers  No sinus pressure  No GI  Was seen once during the symptoms and was treated with abx  OTC medications do not help  Only thing that seems to help resolve her symptoms is resting her voice  The following portions of the patient's history were reviewed and updated as appropriate: allergies, current medications, past family history, past medical history, past social history, past surgical history, and problem list     REVIEW OF SYSTEMS  Review of Systems   Constitutional: Positive for fatigue   Negative for activity change and appetite change  HENT: Positive for congestion, ear discharge, rhinorrhea, sore throat and voice change  Respiratory: Negative  Cardiovascular: Negative  Gastrointestinal: Negative  Neurological: Positive for headaches  Psychiatric/Behavioral: Negative  OBJECTIVE      VITAL SIGNS  BP 90/56 (BP Location: Right arm, Patient Position: Sitting, Cuff Size: Standard)   Pulse 88   Temp 98 9 °F (37 2 °C) (Temporal)   Ht 5' 4 5" (1 638 m)   Wt 60 7 kg (133 lb 12 8 oz)   SpO2 98%   BMI 22 61 kg/m²     CURRENT MEDICATIONS    Current Outpatient Medications:   •  amoxicillin-clavulanate (Augmentin) 875-125 mg per tablet, Take 1 tablet by mouth every 12 (twelve) hours for 7 days, Disp: 14 tablet, Rfl: 0  •  DENTA 5000 PLUS 1 1 % CREA, BRUSH WITH SMALL AMOUNT AT BEDTIME (Patient not taking: No sig reported), Disp: , Rfl:   •  norgestimate-ethinyl estradiol (Tri Femynor) 0 18/0 215/0 25 MG-35 MCG per tablet, Take 1 tablet by mouth daily (Patient not taking: Reported on 11/10/2022), Disp: 84 tablet, Rfl: 1      PHYSICAL EXAMINATION   Physical Exam  Vitals and nursing note reviewed  Constitutional:       General: She is not in acute distress  Appearance: Normal appearance  She is well-developed and well-groomed  She is not ill-appearing  HENT:      Head: Normocephalic  Right Ear: Tympanic membrane, ear canal and external ear normal       Left Ear: Tympanic membrane, ear canal and external ear normal       Nose: Mucosal edema and rhinorrhea present  Rhinorrhea is clear  Mouth/Throat:      Mouth: Mucous membranes are moist       Pharynx: No posterior oropharyngeal erythema  Eyes:      Pupils: Pupils are equal, round, and reactive to light  Cardiovascular:      Rate and Rhythm: Normal rate and regular rhythm  Heart sounds: Normal heart sounds  Pulmonary:      Effort: Pulmonary effort is normal  No respiratory distress  Breath sounds: Normal breath sounds and air entry     Abdominal: Tenderness: There is no abdominal tenderness  Lymphadenopathy:      Head:      Right side of head: Tonsillar adenopathy: tender  Left side of head: Tonsillar adenopathy: tender  Cervical: No cervical adenopathy  Skin:     General: Skin is warm and dry  Neurological:      General: No focal deficit present  Mental Status: She is alert and oriented to person, place, and time     Psychiatric:         Attention and Perception: Attention normal          Mood and Affect: Mood normal          Behavior: Behavior normal

## 2023-04-04 ENCOUNTER — AMB VIDEO VISIT (OUTPATIENT)
Dept: OTHER | Facility: HOSPITAL | Age: 23
End: 2023-04-04

## 2023-04-04 DIAGNOSIS — S80.862A TICK BITE OF LEFT LOWER LEG, INITIAL ENCOUNTER: Primary | ICD-10-CM
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
## 2023-04-04 PROBLEM — R49.9 VOICE COMPLAINT: Status: RESOLVED | Noted: 2022-11-10 | Resolved: 2023-04-04

## 2023-04-04 PROBLEM — J06.9 UPPER RESPIRATORY TRACT INFECTION: Status: RESOLVED | Noted: 2022-11-10 | Resolved: 2023-04-04

## 2023-04-04 NOTE — CARE ANYWHERE EVISITS
Visit Summary for [facility]   Daniel Clark - Gender: Female - Date of Birth: [birth year]  Date: [id number] - Duration: 8 minutes  Patient: [facility]   Tony  Provider: Safia Blackburn PA-C    Patient Contact Information  Address  [address] [place] [address]  [phone]    Visit Topics  Tick bite  [Added By: Self - 2023-04-04]    Triage Questions   What is your current physical address in the event of a medical emergency? Answer []  Are you allergic to any medications? Answer []  Are you now or could you be pregnant? Answer []  Do you have any immune system compromise or chronic lung   disease? Answer []  Do you have any vulnerable family members in the home (infant, pregnant, cancer, elderly)? Answer []     Conversation Transcripts  [0A][0A] [Notification] You are connected with Safia Blackburn PA-C, Urgent Care Specialist [0A][Notification] Kandy Gaitan is located in [address] [place] [address]  [0A][Notification] Kandy Gaitan has shared health history  Elana Velasco  [0A]    Diagnosis  Insect bite (nonvenomous), left ankle, initial encounter    Procedures  Value: 02414 Code: CPT-4 UNLISTED E&M SERVICE    Medications Prescribed    No prescriptions ordered    Electronically signed by: Clair Gramajo(NPI [id number])

## 2023-04-04 NOTE — PROGRESS NOTES
Video Visit - Yossi Kothari 23 y o  female MRN: [id number]    REQUIRED DOCUMENTATION:         1  This service was provided via [employer]  2  Provider located at [address] [place] [id number] [phone]  3  [facility] provider: Baldo Rice PA-C   4  Identify all parties in room with patient during [facility] visit:  No one else  5  After connecting through [employer], patient was identified by name and date of birth  Patient was then informed that this was a Telemedicine visit and that the exam was being conducted confidentially over secure lines  My office door was closed  No one else was in the room  Patient acknowledged consent and understanding of privacy and security of the Telemedicine visit  I informed the patient that I have reviewed their record in Epic and presented the opportunity for them to ask any questions regarding the visit today  The patient agreed to participate  VITALS: Heart Rate: 88 BPM, Respiratory Rate: 16 RPM, Temperature Unavailable° F, Blood Pressure Unavailable mmHg, Pulse Ox Unavailable % on RA    HPI  Pt reports a tick bite  She was bit Saturday, removed it Aj morning  She wasn't concerned about it, but a friend advised her to get it checked out  Denies rash, joint pain, fevers, HAs  Physical Exam  Constitutional:       General: She is not in acute distress  Appearance: Normal appearance  She is not toxic-appearing  HENT:      Head: Normocephalic and atraumatic  Nose: No rhinorrhea  Mouth/Throat:      Mouth: Mucous membranes are moist    Eyes:      Conjunctiva/sclera: Conjunctivae normal    Pulmonary:      Effort: Pulmonary effort is normal  No respiratory distress  Breath sounds: No wheezing (no gross audible wheeze through computer)  Musculoskeletal:      Cervical back: Normal range of motion  Skin:     Findings: Lesion (l lateral lower leg 3 mm area of well demarcated erythema, no bullseye rash) present   No rash (on face or neck)  Neurological:      Mental Status: She is alert  Cranial Nerves: No dysarthria or facial asymmetry  Psychiatric:         Mood and Affect: Mood normal          Behavior: Behavior normal          Diagnoses and all orders for this visit:    Tick bite of left lower leg, initial encounter  -     Lyme Antibody Profile with reflex to WB; Future      Patient Instructions     As discussed, the tick would have had to been attached for >24 hours  If you develop symptoms of lyme or want to be tested, wait two weeks to build up enough antibodies for an accurate test  If you develop a classic bullseye rash, you may be treated sooner    Lyme Disease   AMBULATORY CARE:   Lyme disease  is a bacterial infection caused by the bite of an infected tick  Common signs and symptoms:   • A red rash that is often round and may look like a target or bull's eye    • Fever, chills, or sore throat    • Weakness and tiredness    • Headache or muscle aches    • Joint pain    • Abdominal pain, nausea, or diarrhea    Call your local emergency number (911 in the [address] [facility]) if:   • Your heart is beating faster than usual and you feel dizzy  • You have chest pain or trouble breathing  • You suddenly cannot talk or see well, or you have trouble moving an area of your body  Seek immediate care if:   • You have a headache and a stiff neck  • You have trouble concentrating or thinking clearly  • You have numbness or tingling in your arms or legs, or you have trouble walking  Call your doctor if:   • Your rash grows or spreads to other areas of your body  • You suddenly have trouble falling or staying asleep  • You have new or worsening pain and swelling in your joints  • You have new or worsening weakness and muscle pain  • You have a new tick bite  • You have questions or concerns about your condition or care  Treatment for Lyme disease  includes antibiotics to treat the bacterial infection  Prevent a tick bite:  Ticks live in areas covered by brush and grass  They may even be found in your lawn if you live in certain areas  Outdoor pets can carry ticks inside the house  Ticks can grab onto you or your clothes when you walk by grass or brush  If you go into areas that contain many trees, tall grasses, and underbrush, do the following:     • Wear light colored pants and a long-sleeved shirt  Tuck your pants into your socks or boots  Tuck in your shirt  Wear sleeves that fit close to the skin at your wrists and neck  This will help prevent ticks from crawling through gaps in your clothing and onto your skin  Wear a hat in areas with trees  • Apply insect repellant on your skin  The insect repellant should contain DEET  Do not put insect repellant on skin that is cut, scratched, or irritated  Always use soap and water to wash the insect repellant off as soon as possible once you are indoors  Do not apply insect repellant on your child's face or hands  • Spray insect repellant onto your clothes  Use permethrin spray  This spray kills ticks that crawl on your clothing  Be sure to spray the tops of your boots, bottom of pant legs, and sleeve cuffs  As soon as possible, wash and dry clothing in hot water and high heat  • Check your and your child's clothing, hair, and skin for ticks  Shower within 2 hours of coming indoors  Carefully check the hairline, armpits, neck, and waist     • Decrease the risk for ticks in your yard  Ticks like to live in shady, moist areas  Ambrose Mata your lawn regularly to keep the grass short  Trim the grass around birdbaths and fences  Cut branches that are overgrown and take them out of the yard  Clear out leaf piles  Pierre Part Dikes firewood in a dry, teodora area  • Treat pets with tick control products  as directed  This will decrease your risk for a tick bite  Check your pets for ticks  Remove ticks from pets the same way as you remove them from people   Ask your pet's  about the best product to use on your pet  • Remove a tick with tweezers  Wear gloves  Grasp the tick as close to your skin as possible  Pull the tick straight up and out  Do not touch the tick with your bare hands  Check to make sure you removed the whole tick, including the head  Clean the area with soap and water or rubbing alcohol  Then wash your hands with soap and water  Follow up with your doctor as directed:  Write down your questions so you remember to ask them during your visits  © Copyright [facility] 2022 Information is for End User's use only and may not be sold, redistributed or otherwise used for commercial purposes  The above information is an  only  It is not intended as medical advice for individual conditions or treatments  Talk to your doctor, nurse or pharmacist before following any medical regimen to see if it is safe and effective for you

## 2023-04-04 NOTE — PATIENT INSTRUCTIONS
As discussed, the tick would have had to been attached for >24 hours  If you develop symptoms of lyme or want to be tested, wait two weeks to build up enough antibodies for an accurate test  If you develop a classic bullseye rash, you may be treated sooner    Lyme Disease   AMBULATORY CARE:   Lyme disease  is a bacterial infection caused by the bite of an infected tick  Common signs and symptoms:   A red rash that is often round and may look like a target or bull's eye    Fever, chills, or sore throat    Weakness and tiredness    Headache or muscle aches    Joint pain    Abdominal pain, nausea, or diarrhea    Call your local emergency number (911 in the [address] [facility]) if:   Your heart is beating faster than usual and you feel dizzy  You have chest pain or trouble breathing  You suddenly cannot talk or see well, or you have trouble moving an area of your body  Seek immediate care if:   You have a headache and a stiff neck  You have trouble concentrating or thinking clearly  You have numbness or tingling in your arms or legs, or you have trouble walking  Call your doctor if:   Your rash grows or spreads to other areas of your body  You suddenly have trouble falling or staying asleep  You have new or worsening pain and swelling in your joints  You have new or worsening weakness and muscle pain  You have a new tick bite  You have questions or concerns about your condition or care  Treatment for Lyme disease  includes antibiotics to treat the bacterial infection  Prevent a tick bite:  Ticks live in areas covered by brush and grass  They may even be found in your lawn if you live in certain areas  Outdoor pets can carry ticks inside the house  Ticks can grab onto you or your clothes when you walk by grass or brush  If you go into areas that contain many trees, tall grasses, and underbrush, do the following:     Wear light colored pants and a long-sleeved shirt    Tuck your pants into your socks or boots  Tuck in your shirt  Wear sleeves that fit close to the skin at your wrists and neck  This will help prevent ticks from crawling through gaps in your clothing and onto your skin  Wear a hat in areas with trees  Apply insect repellant on your skin  The insect repellant should contain DEET  Do not put insect repellant on skin that is cut, scratched, or irritated  Always use soap and water to wash the insect repellant off as soon as possible once you are indoors  Do not apply insect repellant on your child's face or hands  Spray insect repellant onto your clothes  Use permethrin spray  This spray kills ticks that crawl on your clothing  Be sure to spray the tops of your boots, bottom of pant legs, and sleeve cuffs  As soon as possible, wash and dry clothing in hot water and high heat  Check your and your child's clothing, hair, and skin for ticks  Shower within 2 hours of coming indoors  Carefully check the hairline, armpits, neck, and waist     Decrease the risk for ticks in your yard  Ticks like to live in shady, moist areas  Darcy Kubas your lawn regularly to keep the grass short  Trim the grass around birdbaths and fences  Cut branches that are overgrown and take them out of the yard  Clear out leaf piles  Brian So firewood in a dry, teodora area  Treat pets with tick control products  as directed  This will decrease your risk for a tick bite  Check your pets for ticks  Remove ticks from pets the same way as you remove them from people  Ask your pet's  about the best product to use on your pet  Remove a tick with tweezers  Wear gloves  Grasp the tick as close to your skin as possible  Pull the tick straight up and out  Do not touch the tick with your bare hands  Check to make sure you removed the whole tick, including the head  Clean the area with soap and water or rubbing alcohol  Then wash your hands with soap and water         Follow up with your doctor as directed:  Write down your questions so you remember to ask them during your visits  © Copyright [employer] 2022 Information is for End User's use only and may not be sold, redistributed or otherwise used for commercial purposes  The above information is an  only  It is not intended as medical advice for individual conditions or treatments  Talk to your doctor, nurse or pharmacist before following any medical regimen to see if it is safe and effective for you

## 2024-01-09 ENCOUNTER — TELEPHONE (OUTPATIENT)
Dept: FAMILY MEDICINE CLINIC | Facility: CLINIC | Age: 24
End: 2024-01-09

## 2024-04-25 ENCOUNTER — TELEPHONE (OUTPATIENT)
Dept: OBGYN CLINIC | Facility: MEDICAL CENTER | Age: 24
End: 2024-04-25

## 2024-04-25 ENCOUNTER — NURSE TRIAGE (OUTPATIENT)
Age: 24
End: 2024-04-25

## 2024-04-25 NOTE — TELEPHONE ENCOUNTER
Alfonsom for pt informing that we scheduled her for 5/7/24 at 245 in our [place] office,if this does not work for her to call our office back.   Progress Notes by Ritchie Farris Damaris London MRN: [id number]   Date: 2018  : 1963   Physician: Ritchie Farris MD    Planned Date of Surgery:[AB1.1T] 2018[AB1.1M]    Location:[AB1.1T] [facility][[employer]      Family History:  Father:  age 71 

## 2024-04-25 NOTE — TELEPHONE ENCOUNTER
"Patient calling to establish care with Dr. Ricks. States she [facility] of [facility]. States after procedure she had menses 24 days later (3/12) and hasn't had menses since. Patient now experiencing brown discharge, cramping, bloating, and fatigue since Tuesday 4/23.  Patient states she is concerned she has asherman syndrome. Denies any heavy bleeding, severe abdominal pain, passing any large clots, or fever. Attempted to make new patient appointment, but scheduling out until June with Dr. Ricks. Advised will make provider aware and will call patient back to schedule new patient appointment. In meantime, advised patient until she can be established she will need to follow up with provider at [facility]. Care advice reviewed. Patient verbalized understanding. No further questions at this time.     Reason for Disposition   Patient wants to be seen    Answer Assessment - Initial Assessment Questions  1. DISCHARGE: \"Describe the discharge.\" (e.g., white, yellow, green, gray, foamy, cottage cheese-like)      Brown spotting discharge and mixed with clear mucous   2. ODOR: \"Is there a bad odor?\"      Yes - fishy   3. ONSET: \"When did the discharge begin?\"      4/23  4. RASH: \"Is there a rash in that area?\" If Yes, ask: \"Describe it.\" (e.g., redness, blisters, sores, bumps)      denies  5. ABDOMINAL PAIN: \"Are you having any abdominal pain?\" If Yes, ask: \"What does it feel like? \" (e.g., crampy, dull, intermittent, constant)       cramping  6. ABDOMINAL PAIN SEVERITY: If present, ask: \"How bad is it?\"  (e.g., mild, moderate, severe)   - MILD - doesn't interfere with normal activities    - MODERATE - interferes with normal activities or awakens from sleep    - SEVERE - patient doesn't want to move (R/O peritonitis)       Cramping comes and goes - sharp that will fade 3-5/10  7. CAUSE: \"What do you think is causing the discharge?\" \"Have you had the same problem before? What happened " "then?\"      unknown  8. OTHER SYMPTOMS: \"Do you have any other symptoms?\" (e.g., fever, itching, vaginal bleeding, pain with urination, injury to genital area, vaginal foreign body)      denies  9. PREGNANCY: \"Is there any chance you are pregnant?\" \"When was your last menstrual period?\"      LMP 3/12. Before pregnancy menses very regular every 30-32 days    Protocols used: Vaginal Discharge-ADULT-[place]    "

## 2024-06-18 ENCOUNTER — OFFICE VISIT (OUTPATIENT)
Dept: OBGYN CLINIC | Facility: MEDICAL CENTER | Age: 24
End: 2024-06-18
Payer: COMMERCIAL

## 2024-06-18 ENCOUNTER — RA CDI HCC (OUTPATIENT)
Dept: OTHER | Facility: HOSPITAL | Age: 24
End: 2024-06-18

## 2024-06-18 VITALS
SYSTOLIC BLOOD PRESSURE: 112 MMHG | BODY MASS INDEX: 23.22 KG/M2 | WEIGHT: 136 LBS | HEIGHT: 64 IN | DIASTOLIC BLOOD PRESSURE: 54 MMHG

## 2024-06-18 DIAGNOSIS — Z01.419 ENCOUNTER FOR GYNECOLOGICAL EXAMINATION: Primary | ICD-10-CM

## 2024-06-18 PROCEDURE — G0145 SCR C/V CYTO,THINLAYER,RESCR: HCPCS | Performed by: OBSTETRICS & GYNECOLOGY

## 2024-06-18 PROCEDURE — S0612 ANNUAL GYNECOLOGICAL EXAMINA: HCPCS | Performed by: OBSTETRICS & GYNECOLOGY

## 2024-06-18 PROCEDURE — G0476 HPV COMBO ASSAY CA SCREEN: HCPCS | Performed by: OBSTETRICS & GYNECOLOGY

## 2024-06-18 NOTE — PROGRESS NOTES
ASSESSMENT & PLAN: Saniya Jimenez is a 23 y.o.  with normal gynecologic exam.    1.  Routine well woman exam done today  2.  Pap:  The patient's last pap was unknown .    It was normal.    Pap was done today.    Current ASCCP Guidelines reviewed.   3.  STD testing  was not done - declines    4. Recent  D&E  - has had cycle  since then   5. The following were reviewed in today's visit: breast self exam and family planning choices  Does not desire birth control     CC:  Annual Gynecologic Examination    HPI: Saniya Jimenez is a 23 y.o.  who presents for annual gynecologic examination.    She has the following concerns:  none     Health Maintenance:    She wears her seatbelt routinely.    She does perform regular monthly self breast exams.    She feels safe at home.     History reviewed. No pertinent past medical history.    Past Surgical History:   Procedure Laterality Date    DILATION AND CURETTAGE OF UTERUS  2024    NO PAST SURGERIES         OB/Gyn History:    OB History          1    Para   0    Term                AB   0    Living   0         SAB        IAB        Ectopic        Multiple        Live Births                     Family History   Problem Relation Age of Onset    No Known Problems Mother     Deep vein thrombosis Father     No Known Problems Sister     Colon cancer Maternal Grandmother     Prostate cancer Maternal Grandfather     Skin cancer Maternal Grandfather     Colon cancer Paternal Grandmother     No Known Problems Paternal Grandfather        Social History:  Social History     Socioeconomic History    Marital status: Single     Spouse name: Not on file    Number of children: Not on file    Years of education: Not on file    Highest education level: Not on file   Occupational History    Not on file   Tobacco Use    Smoking status: Never    Smokeless tobacco: Never   Vaping Use    Vaping status: Never Used   Substance and Sexual Activity    Alcohol use: Yes      "Comment: Soc    Drug use: Yes     Types: Marijuana    Sexual activity: Yes     Partners: Male     Birth control/protection: None   Other Topics Concern    Not on file   Social History Narrative    Not on file     Social Determinants of Health     Financial Resource Strain: Not on file   Food Insecurity: Not on file   Transportation Needs: Not on file   Physical Activity: Not on file   Stress: Not on file   Social Connections: Not on file   Intimate Partner Violence: Not on file   Housing Stability: Not on file         No Known Allergies      Current Outpatient Medications:     PARAGARD INTRAUTERINE COPPER IU, by Intrauterine route (Patient not taking: Reported on 6/18/2024), Disp: , Rfl:     Review of Systems:  Constitutional :no fever, feels well, no tiredness, no recent weight gain or loss  ENT: no ear ache, no loss of hearing, no nosebleeds or nasal discharge, no sore throat or hoarseness.  Cardiovascular: no complaints of slow or fast heart beat, no chest pain, no palpitations, no leg claudication or lower extremity edema.  Respiratory: no complaints of shortness of shortness of breath, no LOZOYA  Breasts:no complaints of breast pain, breast lump, or nipple discharge  Gastrointestinal: no complaints of abdominal pain, constipation, nausea, vomiting, or diarrhea or bloody stools  Genitourinary : no complaints of dysuria, incontinence, pelvic pain, no dysmenorrhea, vaginal discharge or abnormal vaginal bleeding and as noted in HPI.  Musculoskeletal: no complaints of arthralgia, no myalgia, no joint swelling or stiffness, no limb pain or swelling.  Integumentary: no complaints of skin rash or lesion, itching or dry skin  Neurological: no complaints of headache, no confusion, no numbness or tingling, no dizziness or fainting    Objective      /54   Ht 5' 4\" (1.626 m)   Wt 61.7 kg (136 lb)   LMP 05/29/2024 (Exact Date)   BMI 23.34 kg/m²     General:   appears stated age, cooperative, alert normal mood and " affect   Lungs: Unlabored breathing    Breasts: normal appearance, no masses or tenderness, Inspection negative, No nipple retraction or dimpling, No nipple discharge or bleeding, No axillary or supraclavicular adenopathy, Normal to palpation without dominant masses   Abdomen: soft, non-tender, without masses or organomegaly   Vulva: normal, normal female genitalia, Bartholin's, Urethra, [place] normal, no lesions, normal female hair distribution, no clitoral enlargement   Vagina: normal vagina, no discharge, exudate, lesion, or erythema   Urethra: normal   Cervix: Normal, no discharge. Nontender.   Uterus: normal size, contour, position, consistency, mobility, non-tender   Adnexa: no mass, fullness, tenderness   Psychiatric orientation to person, place, and time: normal. mood and affect: normal

## 2024-06-19 LAB
HPV HR 12 DNA CVX QL NAA+PROBE: NEGATIVE
HPV16 DNA CVX QL NAA+PROBE: NEGATIVE
HPV18 DNA CVX QL NAA+PROBE: NEGATIVE

## 2024-06-21 DIAGNOSIS — B37.9 CANDIDA INFECTION: Primary | ICD-10-CM

## 2024-06-21 LAB
LAB AP GYN PRIMARY INTERPRETATION: NORMAL
Lab: NORMAL
PATH INTERP SPEC-IMP: NORMAL

## 2024-06-21 RX ORDER — FLUCONAZOLE 150 MG/1
150 TABLET ORAL ONCE
Qty: 1 TABLET | Refills: 0 | Status: SHIPPED | OUTPATIENT
Start: 2024-06-21 | End: 2024-06-21

## 2024-06-25 ENCOUNTER — APPOINTMENT (OUTPATIENT)
Dept: LAB | Facility: CLINIC | Age: 24
End: 2024-06-25
Payer: COMMERCIAL

## 2024-06-25 ENCOUNTER — OFFICE VISIT (OUTPATIENT)
Dept: FAMILY MEDICINE CLINIC | Facility: CLINIC | Age: 24
End: 2024-06-25
Payer: COMMERCIAL

## 2024-06-25 VITALS
TEMPERATURE: 97.6 F | BODY MASS INDEX: 22.71 KG/M2 | WEIGHT: 133 LBS | SYSTOLIC BLOOD PRESSURE: 100 MMHG | DIASTOLIC BLOOD PRESSURE: 60 MMHG | HEIGHT: 64 IN | HEART RATE: 79 BPM | OXYGEN SATURATION: 99 %

## 2024-06-25 DIAGNOSIS — K21.9 GASTROESOPHAGEAL REFLUX DISEASE, UNSPECIFIED WHETHER ESOPHAGITIS PRESENT: Primary | ICD-10-CM

## 2024-06-25 DIAGNOSIS — K21.9 GASTROESOPHAGEAL REFLUX DISEASE, UNSPECIFIED WHETHER ESOPHAGITIS PRESENT: ICD-10-CM

## 2024-06-25 LAB
ALBUMIN SERPL BCG-MCNC: 4.1 G/DL (ref 3.5–5)
ALP SERPL-CCNC: 38 U/L (ref 34–104)
ALT SERPL W P-5'-P-CCNC: 10 U/L (ref 7–52)
ANION GAP SERPL CALCULATED.3IONS-SCNC: 7 MMOL/L (ref 4–13)
AST SERPL W P-5'-P-CCNC: 13 U/L (ref 13–39)
BASOPHILS # BLD AUTO: 0.03 THOUSANDS/ÂΜL (ref 0–0.1)
BASOPHILS NFR BLD AUTO: 0 % (ref 0–1)
BILIRUB SERPL-MCNC: 0.58 MG/DL (ref 0.2–1)
BUN SERPL-MCNC: 10 MG/DL (ref 5–25)
CALCIUM SERPL-MCNC: 9.4 MG/DL (ref 8.4–10.2)
CHLORIDE SERPL-SCNC: 105 MMOL/L (ref 96–108)
CO2 SERPL-SCNC: 25 MMOL/L (ref 21–32)
CREAT SERPL-MCNC: 0.55 MG/DL (ref 0.6–1.3)
EOSINOPHIL # BLD AUTO: 0.09 THOUSAND/ÂΜL (ref 0–0.61)
EOSINOPHIL NFR BLD AUTO: 1 % (ref 0–6)
ERYTHROCYTE [DISTWIDTH] IN BLOOD BY AUTOMATED COUNT: 15.2 % (ref 11.6–15.1)
GFR SERPL CREATININE-BSD FRML MDRD: 130 ML/MIN/1.73SQ M
GLUCOSE P FAST SERPL-MCNC: 74 MG/DL (ref 65–99)
HCT VFR BLD AUTO: 35.7 % (ref 34.8–46.1)
HGB BLD-MCNC: 11.3 G/DL (ref 11.5–15.4)
IMM GRANULOCYTES # BLD AUTO: 0.01 THOUSAND/UL (ref 0–0.2)
IMM GRANULOCYTES NFR BLD AUTO: 0 % (ref 0–2)
LYMPHOCYTES # BLD AUTO: 2.72 THOUSANDS/ÂΜL (ref 0.6–4.47)
LYMPHOCYTES NFR BLD AUTO: 40 % (ref 14–44)
MCH RBC QN AUTO: 25.3 PG (ref 26.8–34.3)
MCHC RBC AUTO-ENTMCNC: 31.7 G/DL (ref 31.4–37.4)
MCV RBC AUTO: 80 FL (ref 82–98)
MONOCYTES # BLD AUTO: 0.64 THOUSAND/ÂΜL (ref 0.17–1.22)
MONOCYTES NFR BLD AUTO: 9 % (ref 4–12)
NEUTROPHILS # BLD AUTO: 3.34 THOUSANDS/ÂΜL (ref 1.85–7.62)
NEUTS SEG NFR BLD AUTO: 50 % (ref 43–75)
NRBC BLD AUTO-RTO: 0 /100 WBCS
PLATELET # BLD AUTO: 173 THOUSANDS/UL (ref 149–390)
PMV BLD AUTO: 12.8 FL (ref 8.9–12.7)
POTASSIUM SERPL-SCNC: 4.1 MMOL/L (ref 3.5–5.3)
PROT SERPL-MCNC: 7.1 G/DL (ref 6.4–8.4)
RBC # BLD AUTO: 4.47 MILLION/UL (ref 3.81–5.12)
SODIUM SERPL-SCNC: 137 MMOL/L (ref 135–147)
WBC # BLD AUTO: 6.83 THOUSAND/UL (ref 4.31–10.16)

## 2024-06-25 PROCEDURE — 85025 COMPLETE CBC W/AUTO DIFF WBC: CPT

## 2024-06-25 PROCEDURE — 36415 COLL VENOUS BLD VENIPUNCTURE: CPT

## 2024-06-25 PROCEDURE — 99213 OFFICE O/P EST LOW 20 MIN: CPT | Performed by: NURSE PRACTITIONER

## 2024-06-25 PROCEDURE — 80053 COMPREHEN METABOLIC PANEL: CPT

## 2024-06-25 RX ORDER — OMEPRAZOLE 20 MG/1
20 CAPSULE, DELAYED RELEASE ORAL DAILY
Qty: 90 CAPSULE | Refills: 0 | Status: SHIPPED | OUTPATIENT
Start: 2024-06-25

## 2024-06-25 NOTE — ASSESSMENT & PLAN NOTE
Symptoms reviewed at length  Suspect reflux  Will trial Prilosec daily  Advised to keep diary of symptoms, diet/triggers over the next few weeks  F/U 4 weeks to reassess  If symptoms persist despite above-will refer to GI for comprehensive evaluation    Note: We also discussed stress potentially aggravating symptoms.  Declines need for medication at this time.  She will continue with nonpharmacologic management and follow-up if she feels she needs additional treatment options.

## 2024-06-25 NOTE — PROGRESS NOTES
Ambulatory Visit  Name: Saniya Jimenez      : 2000      MRN: [id number]  Encounter Provider: EDGAR Trujillo  Encounter Date: 2024   Encounter department: [facility]    Assessment & Plan   1. Gastroesophageal reflux disease, unspecified whether esophagitis present  Assessment & Plan:  Symptoms reviewed at length  Suspect reflux  Will trial Prilosec daily  Advised to keep diary of symptoms, diet/triggers over the next few weeks  F/U 4 weeks to reassess  If symptoms persist despite above-will refer to GI for comprehensive evaluation    Note: We also discussed stress potentially aggravating symptoms.  Declines need for medication at this time.  She will continue with nonpharmacologic management and follow-up if she feels she needs additional treatment options.  Orders:  -     CBC and differential; Future  -     Comprehensive metabolic panel; Future  -     omeprazole (PriLOSEC) 20 mg delayed release capsule; Take 1 capsule (20 mg total) by mouth daily       History of Present Illness     Acute visit  Presents today with persistent belching. Reports started around December. Reports occurs randomly. Feels like it is uncontrollable. Her stomach feels hungry, but her symptoms do not change after eating something. Does note drinking alcohol increases her symptoms next day. Notes Stewart milk, red sauce onions  and apples make her symptoms worse. Has tried OTC Tums, GasX, Prilosec (one day only) without relief. Reports she doesn't typically try to hold the belching in, but reports feeling like there is something in her throat when she does. Notes some throat soreness if she is belching excessively during the day. Estimates belching  times daily. No change in bowel habits. Reports not feeling anxious but does admit to increased stress this past year. , currently in a master program, recent . Is in talk therapy once monthly - [facility]. Does  "relaxation techniques; Reiki, meditation, running.           Review of Systems   Constitutional: Negative.    Respiratory: Negative.     Cardiovascular: Negative.    Gastrointestinal:  Positive for abdominal distention. Negative for abdominal pain, constipation, diarrhea, nausea and vomiting.        Frequent belching as in HPI   Psychiatric/Behavioral: Negative.          Denies depression or anxiety, but does admit to increased stressors last several months       Objective     /60 (BP Location: Left arm, Patient Position: Sitting, Cuff Size: Standard)   Pulse 79   Temp 97.6 °F (36.4 °C)   Ht 5' 4.17\" (1.63 m)   Wt 60.3 kg (133 lb)   LMP 05/29/2024 (Exact Date)   SpO2 99%   BMI 22.71 kg/m²     Physical Exam  Vitals and nursing note reviewed.   Constitutional:       General: She is not in acute distress.     Appearance: Normal appearance. She is well-developed and well-groomed. She is not ill-appearing.   Cardiovascular:      Rate and Rhythm: Normal rate and regular rhythm.   Pulmonary:      Effort: Pulmonary effort is normal. No respiratory distress.      Breath sounds: Normal breath sounds and air entry.   Abdominal:      General: Abdomen is flat. Bowel sounds are normal.      Palpations: Abdomen is soft.      Tenderness: There is no abdominal tenderness. There is no guarding or rebound.   Skin:     General: Skin is warm and dry.   Neurological:      General: No focal deficit present.      Mental Status: She is alert and oriented to person, place, and time.   Psychiatric:         Attention and Perception: Attention normal.         Mood and Affect: Mood normal.         Behavior: Behavior normal.         Thought Content: Thought content normal.         Judgment: Judgment normal.       Administrative Statements           "

## 2024-07-31 ENCOUNTER — APPOINTMENT (OUTPATIENT)
Dept: LAB | Facility: CLINIC | Age: 24
End: 2024-07-31
Payer: COMMERCIAL

## 2024-07-31 ENCOUNTER — OFFICE VISIT (OUTPATIENT)
Dept: FAMILY MEDICINE CLINIC | Facility: CLINIC | Age: 24
End: 2024-07-31
Payer: COMMERCIAL

## 2024-07-31 VITALS
SYSTOLIC BLOOD PRESSURE: 100 MMHG | TEMPERATURE: 98.7 F | WEIGHT: 138 LBS | OXYGEN SATURATION: 99 % | HEART RATE: 81 BPM | DIASTOLIC BLOOD PRESSURE: 60 MMHG | BODY MASS INDEX: 23.56 KG/M2

## 2024-07-31 DIAGNOSIS — F41.9 ANXIETY: ICD-10-CM

## 2024-07-31 DIAGNOSIS — R09.89 THROAT FULLNESS: ICD-10-CM

## 2024-07-31 DIAGNOSIS — Z00.00 ANNUAL PHYSICAL EXAM: Primary | ICD-10-CM

## 2024-07-31 DIAGNOSIS — Z86.39 HISTORY OF IRON DEFICIENCY: ICD-10-CM

## 2024-07-31 DIAGNOSIS — K21.9 GASTROESOPHAGEAL REFLUX DISEASE, UNSPECIFIED WHETHER ESOPHAGITIS PRESENT: ICD-10-CM

## 2024-07-31 LAB
FERRITIN SERPL-MCNC: 6 NG/ML (ref 11–307)
IRON SATN MFR SERPL: 22 % (ref 15–50)
IRON SERPL-MCNC: 119 UG/DL (ref 50–212)
TIBC SERPL-MCNC: 530 UG/DL (ref 250–450)
UIBC SERPL-MCNC: 411 UG/DL (ref 155–355)

## 2024-07-31 PROCEDURE — 83540 ASSAY OF IRON: CPT

## 2024-07-31 PROCEDURE — 82728 ASSAY OF FERRITIN: CPT

## 2024-07-31 PROCEDURE — 99395 PREV VISIT EST AGE 18-39: CPT | Performed by: NURSE PRACTITIONER

## 2024-07-31 PROCEDURE — 83550 IRON BINDING TEST: CPT

## 2024-07-31 PROCEDURE — 36415 COLL VENOUS BLD VENIPUNCTURE: CPT

## 2024-07-31 NOTE — PROGRESS NOTES
Adult Annual Physical  Name: Saniya Jimenez      : 2000      MRN: [id number]  Encounter Provider: EDGAR Trujillo  Encounter Date: 2024   Encounter department: [facility]    Assessment & Plan   1. Annual physical exam  2. Gastroesophageal reflux disease, unspecified whether esophagitis present  Assessment & Plan:  Belching improved with Prilosec but still occurring  Will refer at this time for GI evaluation/input  Advised keeping journal log to monitor for triggers  Orders:  -     Ambulatory Referral to Gastroenterology; Future  3. Throat fullness  -     Ambulatory Referral to Gastroenterology; Future  4. History of iron deficiency  Assessment & Plan:  Reviewed recent CBC  Reports history of iron deficiency  Check iron panel today  Orders:  -     Iron Panel (Includes Ferritin, Iron Sat%, Iron, and TIBC); Future  5. Anxiety  Assessment & Plan:  Discussed possibility of anxiety component causing the belching.  Discussed pharm vs non pharm treatment options  Discussed SSRI if we opted medication trial  Pt to consider and will report back after GI eval    Denies depression    Immunizations and preventive care screenings were discussed with patient today. Appropriate education was printed on patient's after visit summary.    Counseling:  Alcohol/drug use: discussed moderation in alcohol intake, the recommendations for healthy alcohol use, and avoidance of illicit drug use.  Dental Health: discussed importance of regular tooth brushing, flossing, and dental visits.  Injury prevention: discussed safety/seat belts, safety helmets, smoke detectors, carbon dioxide detectors, and smoking near bedding or upholstery.  Sexual health: discussed sexually transmitted diseases, partner selection, use of condoms, avoidance of unintended pregnancy, and contraceptive alternatives.  Exercise: the importance of regular exercise/physical activity was discussed. Recommend exercise 3-5 times per  week for at least 30 minutes.          History of Present Illness     Adult Annual Physical:  Patient presents for annual physical. Preventative care  Age-appropriate preventative care/recommended screenings reviewed  Reviewed recent lab work: CBC/CMP  Noted anemia-will check iron panel as above  Immunizations up-to-date.     Diet and Physical Activity:  - Diet/Nutrition: well balanced diet.  - Exercise: walking and moderate cardiovascular exercise.    General Health:  - Sleep: sleeps well.  - Hearing: normal hearing bilateral ears.  - Vision: no vision problems and most recent eye exam > 1 year ago. encourage routien eye exams for acuity check and general eye health  - Dental: regular dental visits.    /GYN Health:  - Follows with GYN: yes.   - Menopause: premenopausal.   - History of STDs: no  - Contraception: IUD placement. PAP 2024      Advanced Care Planning:  - Has an advanced directive?: no    - Has a durable medical POA?: no    - ACP document given to patient?: no      Review of Systems   Constitutional: Negative.    HENT: Negative.     Eyes: Negative.    Respiratory: Negative.     Cardiovascular: Negative.    Gastrointestinal: Negative.         Improved, but still belching  Starting to recognize food triggers   Genitourinary: Negative.    Musculoskeletal: Negative.    Skin: Negative.    Neurological: Negative.    Psychiatric/Behavioral: Negative.           Objective     /60 (BP Location: Left arm, Patient Position: Sitting, Cuff Size: Standard)   Pulse 81   Temp 98.7 °F (37.1 °C) (Temporal)   Wt 62.6 kg (138 lb)   SpO2 99%   BMI 23.56 kg/m²     Physical Exam  Vitals and nursing note reviewed.   Constitutional:       General: She is not in acute distress.     Appearance: Normal appearance. She is well-developed and well-groomed. She is not ill-appearing.   HENT:      Head: Normocephalic.      Right Ear: Tympanic membrane, ear canal and external ear normal.      Left Ear: Tympanic membrane, ear  canal and external ear normal.      Nose: Nose normal.      Mouth/Throat:      Mouth: Mucous membranes are moist.      Pharynx: Oropharynx is clear.   Eyes:      Conjunctiva/sclera: Conjunctivae normal.      Pupils: Pupils are equal, round, and reactive to light.   Neck:      Thyroid: No thyromegaly.      Vascular: No carotid bruit.   Cardiovascular:      Rate and Rhythm: Normal rate and regular rhythm.      Pulses:           Posterior tibial pulses are 2+ on the right side and 2+ on the left side.      Heart sounds: Normal heart sounds.   Pulmonary:      Effort: Pulmonary effort is normal. No respiratory distress.      Breath sounds: Normal breath sounds and air entry.   Abdominal:      General: Bowel sounds are normal.      Palpations: Abdomen is soft.      Tenderness: There is no abdominal tenderness.   Musculoskeletal:      Right lower leg: No edema.      Left lower leg: No edema.   Lymphadenopathy:      Cervical: No cervical adenopathy.   Skin:     General: Skin is warm and dry.   Neurological:      General: No focal deficit present.      Mental Status: She is alert and oriented to person, place, and time.   Psychiatric:         Attention and Perception: Attention normal.         Mood and Affect: Mood normal.         Behavior: Behavior normal.         Thought Content: Thought content normal.         Judgment: Judgment normal.

## 2024-08-07 PROBLEM — F41.9 ANXIETY: Status: ACTIVE | Noted: 2024-08-07

## 2024-08-07 PROBLEM — Z86.39 HISTORY OF IRON DEFICIENCY: Status: ACTIVE | Noted: 2024-08-07

## 2024-08-08 NOTE — ASSESSMENT & PLAN NOTE
Discussed possibility of anxiety component causing the belching.  Discussed pharm vs non pharm treatment options  Discussed SSRI if we opted medication trial  Pt to consider and will report back after GI eval    Denies depression

## 2024-08-08 NOTE — ASSESSMENT & PLAN NOTE
Belching improved with Prilosec but still occurring  Will refer at this time for GI evaluation/input  Advised keeping journal log to monitor for triggers

## 2024-08-13 ENCOUNTER — TELEPHONE (OUTPATIENT)
Dept: GASTROENTEROLOGY | Facility: CLINIC | Age: 24
End: 2024-08-13

## 2024-08-13 ENCOUNTER — CONSULT (OUTPATIENT)
Dept: GASTROENTEROLOGY | Facility: CLINIC | Age: 24
End: 2024-08-13
Payer: COMMERCIAL

## 2024-08-13 VITALS
BODY MASS INDEX: 23.39 KG/M2 | WEIGHT: 137 LBS | HEART RATE: 88 BPM | OXYGEN SATURATION: 99 % | HEIGHT: 64 IN | SYSTOLIC BLOOD PRESSURE: 103 MMHG | TEMPERATURE: 97.2 F | DIASTOLIC BLOOD PRESSURE: 69 MMHG

## 2024-08-13 DIAGNOSIS — R11.0 NAUSEA: ICD-10-CM

## 2024-08-13 DIAGNOSIS — K21.9 GASTROESOPHAGEAL REFLUX DISEASE, UNSPECIFIED WHETHER ESOPHAGITIS PRESENT: ICD-10-CM

## 2024-08-13 PROCEDURE — 99244 OFF/OP CNSLTJ NEW/EST MOD 40: CPT | Performed by: DIETITIAN, REGISTERED

## 2024-08-13 RX ORDER — OMEPRAZOLE 40 MG/1
40 CAPSULE, DELAYED RELEASE ORAL DAILY
Qty: 90 CAPSULE | Refills: 3 | Status: SHIPPED | OUTPATIENT
Start: 2024-08-13

## 2024-08-13 NOTE — TELEPHONE ENCOUNTER
Procedure: EGD  Date: 10/03/24  Physician performing: Dr. PEREZ  Location of procedure:  [facility]  Instructions given to patient: EGD  Diabetic: No  Clearances: N/A

## 2024-08-13 NOTE — PROGRESS NOTES
[facility] - Outpatient Consultation New Patient  Saniya Jimenez 23 y.o. female MRN: [id number]  Encounter: [id number]          ASSESSMENT AND PLAN:    1.  GERD  2.  Nausea  Patient reports new onset indigestion since December 2023.  Symptoms first started with frequent belching and queasiness.  She originally attributed this to eating more salads with onions, but then discovered that she was pregnant.  She terminated the pregnancy and expected symptoms to improve, but they have persisted over the last 8 months.  She has made significant changes to her diet, including limiting alcohol, tomato sauce, coffee, onions, but with ongoing symptoms.  She has now been on omeprazole 20 mg daily since June but again, symptoms have remained persistent.  She has frequent belching, nausea, globus pharyngeus, generalized abdominal pain, and bloating.  She denies any heartburn, acid reflux, chest pain, black or bloody stools, changes in bowel movements, unintentional weight loss.  She recently traveled to [place] in 2023.  She denies any significant NSAID use.    -Increase omeprazole to 40 mg once daily 30 minutes before breakfast.  -Continue with excellent dietary/lifestyle management of GERD.  -Recommend upper endoscopy for further evaluation of symptoms.  -Advised patient to call the office or send a [employer] message with any questions/concerns or any new/worsening symptoms.    I discussed informed consent with the patient. The risks/benefits/alternatives of the procedure were discussed with the patient. Risks included, but not limited to, infection, bleeding, perforation, injury to organs in the abdomen, missed lesion and incomplete procedure were discussed. Patient was agreeable.      Follow up 3 months.    ________________________________________________________    HPI:  Saniya Jimenez is a 23 y.o. female who presents for evaluation of GERD.    Patient reports new onset indigestion since December  2023.  Symptoms first started with frequent belching and queasiness.  She originally attributed this to eating more salads with onions, but then discovered that she was pregnant.  She terminated the pregnancy and expected symptoms to improve, but they have persisted over the last 8 months.  She has made significant changes to her diet, including limiting alcohol, tomato sauce, coffee, onions, but with ongoing symptoms.  She has now been on omeprazole 20 mg daily since June but again, symptoms have remained persistent.  She has frequent belching, nausea, globus pharyngeus, generalized abdominal pain, and bloating.  She denies any heartburn, acid reflux, chest pain, black or bloody stools, changes in bowel movements, unintentional weight loss.  She recently traveled to [place] in 2023.  She denies any significant NSAID use.      REVIEW OF SYSTEMS:  10 point ROS reviewed and negative, except as above    Historical Information   History reviewed. No pertinent past medical history.  Past Surgical History:   Procedure Laterality Date   • DILATION AND CURETTAGE OF UTERUS  02/11/2024   • NO PAST SURGERIES       Social History   Social History     Substance and Sexual Activity   Alcohol Use Yes    Comment: Soc     Social History     Substance and Sexual Activity   Drug Use Yes   • Types: Marijuana     Social History     Tobacco Use   Smoking Status Never   Smokeless Tobacco Never     Family History   Problem Relation Age of Onset   • No Known Problems Mother    • Deep vein thrombosis Father    • No Known Problems Sister    • Colon cancer Maternal Grandmother    • Prostate cancer Maternal Grandfather    • Skin cancer Maternal Grandfather    • Colon cancer Paternal Grandmother    • No Known Problems Paternal Grandfather        Meds/Allergies       Current Outpatient Medications:   •  omeprazole (PriLOSEC) 40 MG capsule  •  PARAGARD INTRAUTERINE COPPER IU    No Known Allergies        Objective   Wt Readings from Last 3  Encounters:   08/13/24 62.1 kg (137 lb)   07/31/24 62.6 kg (138 lb)   06/25/24 60.3 kg (133 lb)     Temp Readings from Last 3 Encounters:   08/13/24 (!) 97.2 °F (36.2 °C) (Tympanic)   07/31/24 98.7 °F (37.1 °C) (Temporal)   06/25/24 97.6 °F (36.4 °C)     BP Readings from Last 3 Encounters:   08/13/24 103/69   07/31/24 100/60   06/25/24 100/60     Pulse Readings from Last 3 Encounters:   08/13/24 88   07/31/24 81   06/25/24 79        PHYSICAL EXAM:     Physical Exam  Vitals reviewed.   Constitutional:       General: She is not in acute distress.     Appearance: She is well-developed.   HENT:      Head: Normocephalic and atraumatic.   Eyes:      Conjunctiva/sclera: Conjunctivae normal.   Cardiovascular:      Rate and Rhythm: Normal rate and regular rhythm.      Heart sounds: No murmur heard.  Pulmonary:      Effort: Pulmonary effort is normal. No respiratory distress.      Breath sounds: Normal breath sounds.   Abdominal:      General: Bowel sounds are normal. There is no distension.      Palpations: Abdomen is soft.      Tenderness: There is abdominal tenderness in the suprapubic area. There is no guarding.   Musculoskeletal:         General: No swelling.      Cervical back: Neck supple.   Skin:     General: Skin is warm and dry.   Neurological:      Mental Status: She is alert.   Psychiatric:         Mood and Affect: Mood normal.             Lab Results:   No visits with results within 1 Day(s) from this visit.   Latest known visit with results is:   Appointment on 07/31/2024   Component Date Value   • Iron Saturation 07/31/2024 22    • TIBC 07/31/2024 530 (H)    • Iron 07/31/2024 119    • UIBC 07/31/2024 411 (H)    • Ferritin 07/31/2024 6 (L)        Lab Results   Component Value Date    WBC 6.83 06/25/2024    HGB 11.3 (L) 06/25/2024    HCT 35.7 06/25/2024    MCV 80 (L) 06/25/2024     06/25/2024       Lab Results   Component Value Date     02/08/2017    SODIUM 137 06/25/2024    K 4.1 06/25/2024    CL  105 06/25/2024    CO2 25 06/25/2024    ANIONGAP 7 04/09/2015    AGAP 7 06/25/2024    BUN 10 06/25/2024    CREATININE 0.55 (L) 06/25/2024    GLUC 83 02/12/2024    GLUF 74 06/25/2024    CALCIUM 9.4 06/25/2024    AST 13 06/25/2024    ALT 10 06/25/2024    ALKPHOS 38 06/25/2024    PROT 7.0 02/08/2017    TP 7.1 06/25/2024    BILITOT 0.3 02/08/2017    TBILI 0.58 06/25/2024    EGFR 130 06/25/2024         Radiology Results:   No results found.

## 2024-09-12 ENCOUNTER — PATIENT MESSAGE (OUTPATIENT)
Dept: GASTROENTEROLOGY | Facility: CLINIC | Age: 24
End: 2024-09-12

## 2024-09-19 ENCOUNTER — ANESTHESIA (OUTPATIENT)
Dept: ANESTHESIOLOGY | Facility: HOSPITAL | Age: 24
End: 2024-09-19

## 2024-09-19 ENCOUNTER — ANESTHESIA EVENT (OUTPATIENT)
Dept: ANESTHESIOLOGY | Facility: HOSPITAL | Age: 24
End: 2024-09-19

## 2024-10-03 ENCOUNTER — ANESTHESIA (OUTPATIENT)
Dept: GASTROENTEROLOGY | Facility: MEDICAL CENTER | Age: 24
End: 2024-10-03
Payer: COMMERCIAL

## 2024-10-03 ENCOUNTER — HOSPITAL ENCOUNTER (OUTPATIENT)
Dept: GASTROENTEROLOGY | Facility: MEDICAL CENTER | Age: 24
Setting detail: OUTPATIENT SURGERY
End: 2024-10-03
Payer: COMMERCIAL

## 2024-10-03 ENCOUNTER — ANESTHESIA EVENT (OUTPATIENT)
Dept: GASTROENTEROLOGY | Facility: MEDICAL CENTER | Age: 24
End: 2024-10-03
Payer: COMMERCIAL

## 2024-10-03 VITALS
WEIGHT: 137 LBS | BODY MASS INDEX: 23.39 KG/M2 | DIASTOLIC BLOOD PRESSURE: 65 MMHG | SYSTOLIC BLOOD PRESSURE: 114 MMHG | OXYGEN SATURATION: 99 % | TEMPERATURE: 97.1 F | HEART RATE: 75 BPM | HEIGHT: 64 IN | RESPIRATION RATE: 18 BRPM

## 2024-10-03 DIAGNOSIS — R11.0 NAUSEA: ICD-10-CM

## 2024-10-03 DIAGNOSIS — K21.9 GASTROESOPHAGEAL REFLUX DISEASE, UNSPECIFIED WHETHER ESOPHAGITIS PRESENT: ICD-10-CM

## 2024-10-03 LAB
EXT PREGNANCY TEST URINE: NEGATIVE
EXT. CONTROL: NORMAL

## 2024-10-03 PROCEDURE — 43251 EGD REMOVE LESION SNARE: CPT | Performed by: INTERNAL MEDICINE

## 2024-10-03 PROCEDURE — 43239 EGD BIOPSY SINGLE/MULTIPLE: CPT | Performed by: INTERNAL MEDICINE

## 2024-10-03 PROCEDURE — 81025 URINE PREGNANCY TEST: CPT | Performed by: ANESTHESIOLOGY

## 2024-10-03 PROCEDURE — 88305 TISSUE EXAM BY PATHOLOGIST: CPT | Performed by: PATHOLOGY

## 2024-10-03 RX ORDER — SODIUM CHLORIDE 9 MG/ML
125 INJECTION, SOLUTION INTRAVENOUS CONTINUOUS
Status: CANCELLED | OUTPATIENT
Start: 2024-10-03

## 2024-10-03 RX ORDER — SODIUM CHLORIDE 9 MG/ML
125 INJECTION, SOLUTION INTRAVENOUS CONTINUOUS
Status: DISCONTINUED | OUTPATIENT
Start: 2024-10-03 | End: 2024-10-07 | Stop reason: HOSPADM

## 2024-10-03 RX ORDER — PROPOFOL 10 MG/ML
INJECTION, EMULSION INTRAVENOUS AS NEEDED
Status: DISCONTINUED | OUTPATIENT
Start: 2024-10-03 | End: 2024-10-03

## 2024-10-03 RX ORDER — SODIUM CHLORIDE 9 MG/ML
INJECTION, SOLUTION INTRAVENOUS CONTINUOUS PRN
Status: DISCONTINUED | OUTPATIENT
Start: 2024-10-03 | End: 2024-10-03

## 2024-10-03 RX ADMIN — SODIUM CHLORIDE: 0.9 INJECTION, SOLUTION INTRAVENOUS at 11:39

## 2024-10-03 RX ADMIN — PROPOFOL 130 MG: 10 INJECTION, EMULSION INTRAVENOUS at 11:42

## 2024-10-03 RX ADMIN — PROPOFOL 60 MG: 10 INJECTION, EMULSION INTRAVENOUS at 11:44

## 2024-10-03 RX ADMIN — SODIUM CHLORIDE 125 ML/HR: 0.9 INJECTION, SOLUTION INTRAVENOUS at 10:42

## 2024-10-03 RX ADMIN — PROPOFOL 150 MCG/KG/MIN: 10 INJECTION, EMULSION INTRAVENOUS at 11:43

## 2024-10-03 NOTE — ANESTHESIA POSTPROCEDURE EVALUATION
Post-Op Assessment Note    CV Status:  Stable    Pain management: adequate       Mental Status:  Alert and awake   Hydration Status:  Euvolemic   PONV Controlled:  Controlled   Airway Patency:  Patent     Post Op Vitals Reviewed: Yes    No anethesia notable event occurred.    Staff: CRNA               /58 (10/03/24 1158)    Temp      Pulse 80 (10/03/24 1158)   Resp 14 (10/03/24 1158)    SpO2 99 % (10/03/24 1158)

## 2024-10-03 NOTE — H&P
"History and Physical -  [facility]  Saniya Jimenez 23 y.o. female MRN: [id number]                  HPI: Saniya Jimenez is a 23 y.o. year old female who presents for EGD to investigate nausea and for Huff's screening.      REVIEW OF SYSTEMS: Per the HPI, and otherwise unremarkable.    Historical Information   History reviewed. No pertinent past medical history.  Past Surgical History:   Procedure Laterality Date    DILATION AND CURETTAGE OF UTERUS  02/11/2024    NO PAST SURGERIES       Social History   Social History     Substance and Sexual Activity   Alcohol Use Yes    Comment: Soc     Social History     Substance and Sexual Activity   Drug Use Yes    Types: Marijuana     Social History     Tobacco Use   Smoking Status Never   Smokeless Tobacco Never     Family History   Problem Relation Age of Onset    No Known Problems Mother     Deep vein thrombosis Father     No Known Problems Sister     Colon cancer Maternal Grandmother     Prostate cancer Maternal Grandfather     Skin cancer Maternal Grandfather     Colon cancer Paternal Grandmother     No Known Problems Paternal Grandfather        Meds/Allergies     Not in a hospital admission.    No Known Allergies    Objective     Blood pressure 107/61, pulse 73, temperature (!) 97.1 °F (36.2 °C), temperature source Temporal, resp. rate 16, height 5' 4\" (1.626 m), weight 62.1 kg (137 lb), SpO2 100%.      PHYSICAL EXAM    Gen: NAD  CV: RRR  CHEST: Clear  ABD: soft, NT/ND  EXT: no edema      ASSESSMENT/PLAN:  Saniya Jimenez is a 23 y.o. year old female who presents for EGD to investigate nausea and for Huff's screening. The patient is stable and optimized for the procedure, we reviewed risk and benefits. Risk include but not limited to infection, bleeding, perforation and missing a lesion.          "

## 2024-10-03 NOTE — ANESTHESIA PREPROCEDURE EVALUATION
Procedure:  EGD    Relevant Problems   ANESTHESIA (within normal limits)      CARDIO (within normal limits)      ENDO (within normal limits)      GI/HEPATIC   (+) Gastroesophageal reflux disease      /RENAL (within normal limits)      GYN (within normal limits)      HEMATOLOGY (within normal limits)      MUSCULOSKELETAL (within normal limits)      NEURO/PSYCH   (+) Anxiety      PULMONARY (within normal limits)        Physical Exam    Airway       Dental       Cardiovascular  Cardiovascular exam normal    Pulmonary  Pulmonary exam normal     Other Findings  post-pubertal.      Anesthesia Plan  ASA Score- 1     Anesthesia Type- IV sedation with anesthesia with ASA Monitors.         Additional Monitors:     Airway Plan:            Plan Factors-Exercise tolerance (METS): >4 METS.    Chart reviewed.    Patient summary reviewed.    Patient is not a current smoker. Patient not instructed to abstain from smoking on day of procedure. Patient did not smoke on day of surgery.            Induction-     Postoperative Plan-         Informed Consent- Anesthetic plan and risks discussed with patient.  I personally reviewed this patient with the CRNA. Discussed and agreed on the Anesthesia Plan with the CRNA..

## 2024-10-07 PROCEDURE — 88305 TISSUE EXAM BY PATHOLOGIST: CPT | Performed by: PATHOLOGY

## 2024-10-15 ENCOUNTER — TELEPHONE (OUTPATIENT)
Dept: GASTROENTEROLOGY | Facility: CLINIC | Age: 24
End: 2024-10-15

## 2024-10-15 NOTE — TELEPHONE ENCOUNTER
I left a voice message to inform patient of results and provided the office number with any further questions or concerns, Thank you           Biopsy taken from the stomach and the polyp removed from the stomach did not show any significant findings.  Good news.  I did see mild inflammation in the esophagus during EGD and recommended to continue omeprazole.  Please continue to take that.  Please do not hesitate to contact us with any questions or concerns and continue to follow-up in the GI office with Dargan Stahl.